# Patient Record
Sex: FEMALE | Race: WHITE | NOT HISPANIC OR LATINO | Employment: FULL TIME | URBAN - METROPOLITAN AREA
[De-identification: names, ages, dates, MRNs, and addresses within clinical notes are randomized per-mention and may not be internally consistent; named-entity substitution may affect disease eponyms.]

---

## 2023-03-27 LAB
CHOLESTEROL (MG/DL) IN SER/PLAS: 127 MG/DL (ref 0–199)
CHOLESTEROL IN HDL (MG/DL) IN SER/PLAS: 48.4 MG/DL
CHOLESTEROL/HDL RATIO: 2.6
LDL: 63 MG/DL (ref 0–99)
TRIGLYCERIDE (MG/DL) IN SER/PLAS: 76 MG/DL (ref 0–149)
VLDL: 15 MG/DL (ref 0–40)

## 2023-05-01 LAB
ALANINE AMINOTRANSFERASE (SGPT) (U/L) IN SER/PLAS: 16 U/L (ref 7–45)
ALBUMIN (G/DL) IN SER/PLAS: 4.6 G/DL (ref 3.4–5)
ALKALINE PHOSPHATASE (U/L) IN SER/PLAS: 61 U/L (ref 33–136)
ANION GAP IN SER/PLAS: 11 MMOL/L (ref 10–20)
ASPARTATE AMINOTRANSFERASE (SGOT) (U/L) IN SER/PLAS: 19 U/L (ref 9–39)
BASOPHILS (10*3/UL) IN BLOOD BY AUTOMATED COUNT: 0.03 X10E9/L (ref 0–0.1)
BASOPHILS/100 LEUKOCYTES IN BLOOD BY AUTOMATED COUNT: 0.6 % (ref 0–2)
BILIRUBIN TOTAL (MG/DL) IN SER/PLAS: 0.4 MG/DL (ref 0–1.2)
CALCIDIOL (25 OH VITAMIN D3) (NG/ML) IN SER/PLAS: 43 NG/ML
CALCIUM (MG/DL) IN SER/PLAS: 9.3 MG/DL (ref 8.6–10.3)
CARBON DIOXIDE, TOTAL (MMOL/L) IN SER/PLAS: 28 MMOL/L (ref 21–32)
CHLORIDE (MMOL/L) IN SER/PLAS: 102 MMOL/L (ref 98–107)
CREATININE (MG/DL) IN SER/PLAS: 0.81 MG/DL (ref 0.5–1.05)
EOSINOPHILS (10*3/UL) IN BLOOD BY AUTOMATED COUNT: 0.1 X10E9/L (ref 0–0.7)
EOSINOPHILS/100 LEUKOCYTES IN BLOOD BY AUTOMATED COUNT: 2 % (ref 0–6)
ERYTHROCYTE DISTRIBUTION WIDTH (RATIO) BY AUTOMATED COUNT: 12.4 % (ref 11.5–14.5)
ERYTHROCYTE MEAN CORPUSCULAR HEMOGLOBIN CONCENTRATION (G/DL) BY AUTOMATED: 32.4 G/DL (ref 32–36)
ERYTHROCYTE MEAN CORPUSCULAR VOLUME (FL) BY AUTOMATED COUNT: 86 FL (ref 80–100)
ERYTHROCYTES (10*6/UL) IN BLOOD BY AUTOMATED COUNT: 4.61 X10E12/L (ref 4–5.2)
GFR FEMALE: 82 ML/MIN/1.73M2
GLUCOSE (MG/DL) IN SER/PLAS: 109 MG/DL (ref 74–99)
HEMATOCRIT (%) IN BLOOD BY AUTOMATED COUNT: 39.8 % (ref 36–46)
HEMOGLOBIN (G/DL) IN BLOOD: 12.9 G/DL (ref 12–16)
IMMATURE GRANULOCYTES/100 LEUKOCYTES IN BLOOD BY AUTOMATED COUNT: 0.2 % (ref 0–0.9)
LEUKOCYTES (10*3/UL) IN BLOOD BY AUTOMATED COUNT: 5 X10E9/L (ref 4.4–11.3)
LYMPHOCYTES (10*3/UL) IN BLOOD BY AUTOMATED COUNT: 2.05 X10E9/L (ref 1.2–4.8)
LYMPHOCYTES/100 LEUKOCYTES IN BLOOD BY AUTOMATED COUNT: 41.3 % (ref 13–44)
MONOCYTES (10*3/UL) IN BLOOD BY AUTOMATED COUNT: 0.3 X10E9/L (ref 0.1–1)
MONOCYTES/100 LEUKOCYTES IN BLOOD BY AUTOMATED COUNT: 6 % (ref 2–10)
NEUTROPHILS (10*3/UL) IN BLOOD BY AUTOMATED COUNT: 2.47 X10E9/L (ref 1.2–7.7)
NEUTROPHILS/100 LEUKOCYTES IN BLOOD BY AUTOMATED COUNT: 49.9 % (ref 40–80)
NRBC (PER 100 WBCS) BY AUTOMATED COUNT: 0 /100 WBC (ref 0–0)
PLATELETS (10*3/UL) IN BLOOD AUTOMATED COUNT: 313 X10E9/L (ref 150–450)
POTASSIUM (MMOL/L) IN SER/PLAS: 4 MMOL/L (ref 3.5–5.3)
PROTEIN TOTAL: 7.4 G/DL (ref 6.4–8.2)
SEDIMENTATION RATE, ERYTHROCYTE: 4 MM/H (ref 0–30)
SODIUM (MMOL/L) IN SER/PLAS: 137 MMOL/L (ref 136–145)
THYROTROPIN (MIU/L) IN SER/PLAS BY DETECTION LIMIT <= 0.05 MIU/L: 1.82 MIU/L (ref 0.44–3.98)
UREA NITROGEN (MG/DL) IN SER/PLAS: 14 MG/DL (ref 6–23)

## 2023-05-03 LAB
ANA PATTERN: ABNORMAL
ANA TITER: ABNORMAL
ANTI-CENTROMERE: <0.2 AI
ANTI-CHROMATIN: <0.2 AI
ANTI-DNA (DS): 1 IU/ML
ANTI-JO-1 IGG: <0.2 AI
ANTI-NUCLEAR ANTIBODY (ANA): POSITIVE
ANTI-RIBOSOMAL P: <0.2 AI
ANTI-RNP: 0.4 AI
ANTI-SCL-70: <0.2 AI
ANTI-SM/RNP: <0.2 AI
ANTI-SM: <0.2 AI
ANTI-SSA: <0.2 AI
ANTI-SSB: <0.2 AI

## 2023-08-22 LAB
APPEARANCE, URINE: CLEAR
BILIRUBIN, URINE: NEGATIVE
BLOOD, URINE: NEGATIVE
COLOR, URINE: YELLOW
COMPLEMENT C3 (MG/DL) IN SER/PLAS: 145 MG/DL (ref 87–200)
COMPLEMENT C4 (MG/DL) IN SER/PLAS: 27 MG/DL (ref 10–50)
GLUCOSE, URINE: NEGATIVE MG/DL
KETONES, URINE: NEGATIVE MG/DL
LEUKOCYTE ESTERASE, URINE: ABNORMAL
MUCUS, URINE: NORMAL /LPF
NITRITE, URINE: NEGATIVE
PH, URINE: 8 (ref 5–8)
PROTEIN, URINE: NEGATIVE MG/DL
RBC, URINE: NORMAL /HPF (ref 0–5)
SPECIFIC GRAVITY, URINE: 1.01 (ref 1–1.03)
SQUAMOUS EPITHELIAL CELLS, URINE: <1 /HPF
UROBILINOGEN, URINE: <2 MG/DL (ref 0–1.9)
WBC, URINE: 2 /HPF (ref 0–5)

## 2023-10-10 ENCOUNTER — APPOINTMENT (OUTPATIENT)
Dept: RHEUMATOLOGY | Facility: CLINIC | Age: 63
End: 2023-10-10

## 2023-10-23 ENCOUNTER — TELEPHONE (OUTPATIENT)
Dept: PRIMARY CARE | Facility: CLINIC | Age: 63
End: 2023-10-23

## 2023-10-23 RX ORDER — DULOXETIN HYDROCHLORIDE 60 MG/1
CAPSULE, DELAYED RELEASE ORAL
COMMUNITY
End: 2023-10-26 | Stop reason: SDUPTHER

## 2023-10-23 NOTE — TELEPHONE ENCOUNTER
Rx Refill Request Telephone Encounter    Name:  Celia Gomez  :  [id number]  Medication Name:  duloxetine  60mg  By mouth  daily  90    Specific Pharmacy location:  [facility]  Date of last appointment:    Date of next appointment:  no appt scheduled  Best number to reach patient:  [phone]

## 2023-10-26 ENCOUNTER — TELEPHONE (OUTPATIENT)
Dept: OBSTETRICS AND GYNECOLOGY | Facility: CLINIC | Age: 63
End: 2023-10-26

## 2023-10-26 DIAGNOSIS — M79.7 FIBROMYALGIA: Primary | ICD-10-CM

## 2023-10-26 DIAGNOSIS — Z12.31 VISIT FOR SCREENING MAMMOGRAM: ICD-10-CM

## 2023-10-26 RX ORDER — DULOXETIN HYDROCHLORIDE 60 MG/1
60 CAPSULE, DELAYED RELEASE ORAL DAILY
Qty: 30 CAPSULE | Refills: 0 | Status: SHIPPED | OUTPATIENT
Start: 2023-10-26 | End: 2023-11-21 | Stop reason: SDUPTHER

## 2023-10-26 NOTE — TELEPHONE ENCOUNTER
Pt. Has here annual scheduled for 2/1/2023. She is asking for a mammogram order prior to this appointment.

## 2023-10-26 NOTE — PROGRESS NOTES
She was supposed to be seen in 3 months time or something like that and it is due to be seen nevertheless as she ran out of the medication I am sending 30 days prescription and she will be asked to be seen  And all the medications needs to be reconciled and reviewed in the system again

## 2023-11-09 ENCOUNTER — HOSPITAL ENCOUNTER (OUTPATIENT)
Dept: RADIOLOGY | Facility: HOSPITAL | Age: 63
Discharge: HOME | End: 2023-11-09
Payer: COMMERCIAL

## 2023-11-09 VITALS — BODY MASS INDEX: 28.93 KG/M2 | WEIGHT: 180 LBS | HEIGHT: 66 IN

## 2023-11-09 DIAGNOSIS — N63.0 LUMP IN FEMALE BREAST: ICD-10-CM

## 2023-11-09 DIAGNOSIS — Z12.31 VISIT FOR SCREENING MAMMOGRAM: ICD-10-CM

## 2023-11-09 PROCEDURE — 76642 ULTRASOUND BREAST LIMITED: CPT | Performed by: RADIOLOGY

## 2023-11-09 PROCEDURE — 77066 DX MAMMO INCL CAD BI: CPT | Performed by: RADIOLOGY

## 2023-11-09 PROCEDURE — 77062 BREAST TOMOSYNTHESIS BI: CPT | Performed by: RADIOLOGY

## 2023-11-09 PROCEDURE — 77062 BREAST TOMOSYNTHESIS BI: CPT

## 2023-11-09 PROCEDURE — 76642 ULTRASOUND BREAST LIMITED: CPT | Mod: LT

## 2023-11-21 ENCOUNTER — OFFICE VISIT (OUTPATIENT)
Dept: PRIMARY CARE | Facility: CLINIC | Age: 63
End: 2023-11-21
Payer: COMMERCIAL

## 2023-11-21 VITALS
BODY MASS INDEX: 30.25 KG/M2 | TEMPERATURE: 98.4 F | DIASTOLIC BLOOD PRESSURE: 87 MMHG | HEIGHT: 66 IN | SYSTOLIC BLOOD PRESSURE: 130 MMHG | WEIGHT: 188.2 LBS | HEART RATE: 83 BPM

## 2023-11-21 DIAGNOSIS — K57.30 DIVERTICULOSIS OF COLON: ICD-10-CM

## 2023-11-21 DIAGNOSIS — L94.0 REYNOLDS SYNDROME (MULTI): ICD-10-CM

## 2023-11-21 DIAGNOSIS — K21.9 GASTROESOPHAGEAL REFLUX DISEASE WITHOUT ESOPHAGITIS: Primary | ICD-10-CM

## 2023-11-21 DIAGNOSIS — G47.33 OSA (OBSTRUCTIVE SLEEP APNEA): ICD-10-CM

## 2023-11-21 DIAGNOSIS — I10 HTN (HYPERTENSION), BENIGN: ICD-10-CM

## 2023-11-21 DIAGNOSIS — H92.09 EAR ACHE: ICD-10-CM

## 2023-11-21 DIAGNOSIS — E78.5 HYPERLIPIDEMIA, UNSPECIFIED HYPERLIPIDEMIA TYPE: ICD-10-CM

## 2023-11-21 DIAGNOSIS — M79.7 FIBROMYALGIA: ICD-10-CM

## 2023-11-21 DIAGNOSIS — Z00.00 WELL ADULT HEALTH CHECK: ICD-10-CM

## 2023-11-21 DIAGNOSIS — R76.8 ANA POSITIVE: ICD-10-CM

## 2023-11-21 DIAGNOSIS — K74.3 REYNOLDS SYNDROME (MULTI): ICD-10-CM

## 2023-11-21 PROBLEM — J06.9 UPPER RESPIRATORY INFECTION: Status: ACTIVE | Noted: 2023-11-21

## 2023-11-21 PROBLEM — R92.8 ABNORMAL MAMMOGRAM: Status: ACTIVE | Noted: 2023-11-21

## 2023-11-21 PROBLEM — G89.29 CHRONIC PAIN OF BOTH KNEES: Status: ACTIVE | Noted: 2023-11-21
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
## 2023-11-21 PROBLEM — M47.22 OSTEOARTHRITIS OF SPINE WITH RADICULOPATHY, CERVICAL REGION: Status: ACTIVE | Noted: 2023-11-21

## 2023-11-21 PROBLEM — J30.9 ALLERGIC RHINITIS: Status: ACTIVE | Noted: 2023-11-21

## 2023-11-21 PROBLEM — D64.9 ANEMIA: Status: ACTIVE | Noted: 2023-11-21

## 2023-11-21 PROBLEM — S20.219A CONTUSION OF CHEST WALL: Status: ACTIVE | Noted: 2023-11-21

## 2023-11-21 PROBLEM — L71.9 ROSACEA: Status: ACTIVE | Noted: 2023-11-21

## 2023-11-21 PROBLEM — R82.90 ABNORMAL URINE FINDINGS: Status: ACTIVE | Noted: 2023-11-21

## 2023-11-21 PROBLEM — M71.9 DISORDER OF BURSAE OF SHOULDER REGION: Status: ACTIVE | Noted: 2022-06-13

## 2023-11-21 PROBLEM — H10.9 CONJUNCTIVITIS, BACTERIAL: Status: ACTIVE | Noted: 2023-11-21

## 2023-11-21 PROBLEM — R09.81 NASAL CONGESTION: Status: ACTIVE | Noted: 2023-11-21

## 2023-11-21 PROBLEM — J40 BRONCHITIS: Status: ACTIVE | Noted: 2023-11-21

## 2023-11-21 PROBLEM — R73.9 HYPERGLYCEMIA: Status: ACTIVE | Noted: 2023-11-21

## 2023-11-21 PROBLEM — S92.309A CLOSED FRACTURE OF METATARSAL BONE: Status: ACTIVE | Noted: 2022-05-10

## 2023-11-21 PROBLEM — R52 ACUTE PAIN: Status: ACTIVE | Noted: 2023-11-21

## 2023-11-21 PROBLEM — R10.9 ABDOMINAL CRAMPING: Status: ACTIVE | Noted: 2023-11-21

## 2023-11-21 PROBLEM — R51.9 HEADACHE: Status: ACTIVE | Noted: 2023-11-21

## 2023-11-21 PROBLEM — S62.101A WRIST FRACTURE, RIGHT: Status: ACTIVE | Noted: 2023-11-21

## 2023-11-21 PROBLEM — R05.9 COUGH: Status: ACTIVE | Noted: 2023-11-21

## 2023-11-21 PROBLEM — R20.2 PARESTHESIA AND PAIN OF EXTREMITY: Status: ACTIVE | Noted: 2023-11-21

## 2023-11-21 PROBLEM — S83.429A SPRAIN OF LATERAL COLLATERAL LIGAMENT OF KNEE: Status: ACTIVE | Noted: 2022-05-10

## 2023-11-21 PROBLEM — J45.990 ASTHMA, EXERCISE INDUCED (HHS-HCC): Status: ACTIVE | Noted: 2023-11-21

## 2023-11-21 PROBLEM — N63.0 BREAST MASS: Status: ACTIVE | Noted: 2023-11-21

## 2023-11-21 PROBLEM — M25.562 CHRONIC PAIN OF BOTH KNEES: Status: ACTIVE | Noted: 2023-11-21

## 2023-11-21 PROBLEM — S52.539A CLOSED COLLES' FRACTURE: Status: ACTIVE | Noted: 2022-05-10

## 2023-11-21 PROBLEM — M79.609 PARESTHESIA AND PAIN OF EXTREMITY: Status: ACTIVE | Noted: 2023-11-21

## 2023-11-21 PROBLEM — N32.9 BLADDER DISORDER: Status: ACTIVE | Noted: 2023-11-21

## 2023-11-21 PROBLEM — J02.9 SORE THROAT: Status: ACTIVE | Noted: 2023-11-21

## 2023-11-21 PROBLEM — H92.01 RIGHT EAR PAIN: Status: ACTIVE | Noted: 2023-11-21

## 2023-11-21 PROBLEM — K52.9 CHRONIC DIARRHEA: Status: ACTIVE | Noted: 2023-11-21

## 2023-11-21 PROBLEM — N63.20 LUMP OF BREAST, LEFT: Status: ACTIVE | Noted: 2023-11-21

## 2023-11-21 PROBLEM — M47.812 DJD (DEGENERATIVE JOINT DISEASE) OF CERVICAL SPINE: Status: ACTIVE | Noted: 2023-11-21

## 2023-11-21 PROBLEM — M25.579 ANKLE PAIN: Status: ACTIVE | Noted: 2023-11-21

## 2023-11-21 PROBLEM — D36.9 ADENOMATOUS POLYPS: Status: ACTIVE | Noted: 2023-11-21

## 2023-11-21 PROBLEM — N95.2 POSTMENOPAUSAL ATROPHIC VAGINITIS: Status: ACTIVE | Noted: 2023-11-21

## 2023-11-21 PROBLEM — D36.9 INTRADUCTAL PAPILLOMA: Status: ACTIVE | Noted: 2023-11-21

## 2023-11-21 PROBLEM — Z20.822 EXPOSURE TO COVID-19 VIRUS: Status: ACTIVE | Noted: 2023-11-21

## 2023-11-21 PROBLEM — M25.561 CHRONIC PAIN OF BOTH KNEES: Status: ACTIVE | Noted: 2023-11-21

## 2023-11-21 PROBLEM — J98.01 COUGH DUE TO BRONCHOSPASM: Status: ACTIVE | Noted: 2023-11-21

## 2023-11-21 PROBLEM — R23.2 HOT FLASHES: Status: ACTIVE | Noted: 2023-11-21

## 2023-11-21 PROBLEM — M62.838 MUSCLE SPASMS OF NECK: Status: ACTIVE | Noted: 2023-11-21

## 2023-11-21 PROCEDURE — 3075F SYST BP GE 130 - 139MM HG: CPT | Performed by: INTERNAL MEDICINE

## 2023-11-21 PROCEDURE — 3079F DIAST BP 80-89 MM HG: CPT | Performed by: INTERNAL MEDICINE

## 2023-11-21 PROCEDURE — 1036F TOBACCO NON-USER: CPT | Performed by: INTERNAL MEDICINE

## 2023-11-21 PROCEDURE — 99214 OFFICE O/P EST MOD 30 MIN: CPT | Performed by: INTERNAL MEDICINE

## 2023-11-21 RX ORDER — HYDROCHLOROTHIAZIDE 25 MG/1
25 TABLET ORAL DAILY
COMMUNITY
Start: 2023-09-02 | End: 2023-11-21 | Stop reason: SDUPTHER

## 2023-11-21 RX ORDER — UBIDECARENONE 75 MG
1 CAPSULE ORAL DAILY
COMMUNITY

## 2023-11-21 RX ORDER — PANTOPRAZOLE SODIUM 40 MG/1
40 TABLET, DELAYED RELEASE ORAL DAILY
Qty: 90 TABLET | Refills: 1 | Status: SHIPPED | OUTPATIENT
Start: 2023-11-21 | End: 2024-11-20

## 2023-11-21 RX ORDER — DULOXETIN HYDROCHLORIDE 60 MG/1
60 CAPSULE, DELAYED RELEASE ORAL DAILY
Qty: 30 CAPSULE | Refills: 0 | Status: SHIPPED | OUTPATIENT
Start: 2023-11-21 | End: 2024-01-04 | Stop reason: SDUPTHER

## 2023-11-21 RX ORDER — ROSUVASTATIN CALCIUM 5 MG/1
5 TABLET, COATED ORAL DAILY
Qty: 90 TABLET | Refills: 0 | Status: SHIPPED | OUTPATIENT
Start: 2023-11-21 | End: 2024-03-26 | Stop reason: SDUPTHER

## 2023-11-21 RX ORDER — CHOLECALCIFEROL (VITAMIN D3) 25 MCG
1000 TABLET ORAL DAILY
COMMUNITY

## 2023-11-21 RX ORDER — CALCIUM CARBONATE/VITAMIN D3 500-10/5ML
1 LIQUID (ML) ORAL DAILY
COMMUNITY

## 2023-11-21 RX ORDER — OMEPRAZOLE 20 MG/1
20 CAPSULE, DELAYED RELEASE ORAL EVERY OTHER DAY
COMMUNITY
Start: 2023-07-22 | End: 2023-11-21 | Stop reason: ALTCHOICE

## 2023-11-21 RX ORDER — HYDROCHLOROTHIAZIDE 25 MG/1
25 TABLET ORAL DAILY
Qty: 90 TABLET | Refills: 0 | Status: SHIPPED | OUTPATIENT
Start: 2023-11-21 | End: 2024-03-26 | Stop reason: SDUPTHER

## 2023-11-21 RX ORDER — ROSUVASTATIN CALCIUM 5 MG/1
5 TABLET, COATED ORAL DAILY
COMMUNITY
End: 2023-11-21 | Stop reason: SDUPTHER

## 2023-11-21 NOTE — PROGRESS NOTES
Assessment/Plan   Problem List Items Addressed This Visit       Diverticulosis of colon    GERD (gastroesophageal reflux disease) - Primary    Relevant Medications    pantoprazole (ProtoNix) 40 mg EC tablet    Other Relevant Orders    CBC and Auto Differential    Comprehensive Metabolic Panel    Fibromyalgia    Relevant Medications    DULoxetine (Cymbalta) 60 mg DR capsule    Other Relevant Orders    CBC and Auto Differential    Comprehensive Metabolic Panel    HTN (hypertension), benign    Relevant Medications    hydroCHLOROthiazide (HYDRODiuril) 25 mg tablet    JEREMY (obstructive sleep apnea)    Relevant Orders    Referral to ENT    Burrell syndrome (CMS/HCC)    Ear ache    Relevant Orders    Referral to ENT    Well adult health check    Relevant Orders    Comprehensive Metabolic Panel    XU positive     Other Visit Diagnoses       Hyperlipidemia, unspecified hyperlipidemia type        Relevant Medications    rosuvastatin (Crestor) 5 mg tablet    Other Relevant Orders    Lipid Panel        Follow-up in 3 months unless otherwise indicated  All the conditions discussed  In view of her recurrent symptoms of different nature in oropharyngeal pathway atypical GERD is being considering changing omeprazole to Protonix and advised to see ENT physician  Eventually further follow-up with rheumatologist may be needed    Subjective   Patient ID: Celia Gomez is a 63 y.o. female who presents for Otitis Media (Right ear pain.).    Past Surgical History:   Procedure Laterality Date    CT ABDOMEN PELVIS ANGIOGRAM W AND/OR WO IV CONTRAST  05/04/2023    CT ABDOMEN PELVIS ANGIOGRAM W AND/OR WO IV CONTRAST 5/4/2023 [facility]    INCISIONAL BREAST BIOPSY  10/10/2017    Incisional Breast Biopsy    OTHER SURGICAL HISTORY  12/17/2012    Electrocardiogram    OTHER SURGICAL HISTORY  10/26/2022    Endoscopy    OTHER SURGICAL HISTORY  10/26/2022    Colonoscopy    OTHER SURGICAL HISTORY  05/23/2022    Wrist surgery    OTHER SURGICAL HISTORY   10/10/2017    Breast Surgery Puncture Aspiration Of Cyst      Family History   Problem Relation Name Age of Onset    Breast cancer Maternal Grandmother  60      Social History     Socioeconomic History    Marital status:      Spouse name: Not on file    Number of children: Not on file    Years of education: Not on file    Highest education level: Not on file   Occupational History    Not on file   Tobacco Use    Smoking status: Former     Types: Cigarettes     Quit date:      Years since quittin.8    Smokeless tobacco: Never   Substance and Sexual Activity    Alcohol use: Not Currently    Drug use: Never    Sexual activity: Not on file   Other Topics Concern    Not on file   Social History Narrative    Not on file     Social Determinants of Health     Financial Resource Strain: Not on file   Food Insecurity: Not on file   Transportation Needs: Not on file   Physical Activity: Not on file   Stress: Not on file   Social Connections: Not on file   Intimate Partner Violence: Not on file   Housing Stability: Not on file      Bee pollen, Fruit flavor, and Mold   Current Outpatient Medications   Medication Sig Dispense Refill    cholecalciferol (Vitamin D-3) 25 MCG (1000 UT) tablet Take 1 tablet (1,000 Units) by mouth once daily.      cyanocobalamin (Vitamin B-12) 500 mcg tablet Take 1 tablet (500 mcg) by mouth once daily.      magnesium oxide 400 mg magnesium capsule Take 1 capsule (400 mg) by mouth once daily.      multivit-min/ferrous fumarate (MULTI VITAMIN ORAL) Take 1 tablet by mouth once daily.      TURMERIC ORAL Take 1 tablet by mouth once daily.      DULoxetine (Cymbalta) 60 mg DR capsule Take 1 capsule (60 mg) by mouth once daily. 30 capsule 0    hydroCHLOROthiazide (HYDRODiuril) 25 mg tablet Take 1 tablet (25 mg) by mouth once daily. 90 tablet 0    pantoprazole (ProtoNix) 40 mg EC tablet Take 1 tablet (40 mg) by mouth once daily. Do not crush, chew, or split. 90 tablet 1    rosuvastatin (Crestor)  "5 mg tablet Take 1 tablet (5 mg) by mouth once daily. 90 tablet 0     No current facility-administered medications for this visit.      Vitals:    11/21/23 0947   BP: 130/87   BP Location: Left arm   Patient Position: Sitting   Pulse: 83   Temp: 36.9 °C (98.4 °F)   Weight: 85.4 kg (188 lb 3.2 oz)   Height: 1.676 m (5' 6\")      Problem List Items Addressed This Visit       Diverticulosis of colon    GERD (gastroesophageal reflux disease) - Primary    Relevant Medications    pantoprazole (ProtoNix) 40 mg EC tablet    Other Relevant Orders    CBC and Auto Differential    Comprehensive Metabolic Panel    Fibromyalgia    Relevant Medications    DULoxetine (Cymbalta) 60 mg DR capsule    Other Relevant Orders    CBC and Auto Differential    Comprehensive Metabolic Panel    HTN (hypertension), benign    Relevant Medications    hydroCHLOROthiazide (HYDRODiuril) 25 mg tablet    JEREMY (obstructive sleep apnea)    Relevant Orders    Referral to ENT    Burrell syndrome (CMS/HCC)    Ear ache    Relevant Orders    Referral to ENT    Well adult health check    Relevant Orders    Comprehensive Metabolic Panel    XU positive     Other Visit Diagnoses       Hyperlipidemia, unspecified hyperlipidemia type        Relevant Medications    rosuvastatin (Crestor) 5 mg tablet    Other Relevant Orders    Lipid Panel           Orders Placed This Encounter   Procedures    CBC and Auto Differential     Standing Status:   Future     Standing Expiration Date:   11/21/2024     Order Specific Question:   Release result to MyChart     Answer:   Immediate    Comprehensive Metabolic Panel     Standing Status:   Future     Standing Expiration Date:   11/21/2024     Order Specific Question:   Release result to MyChart     Answer:   Immediate    Lipid Panel     Standing Status:   Future     Standing Expiration Date:   11/21/2024     Order Specific Question:   Release result to MyChart     Answer:   Immediate    Referral to ENT     Standing Status:   " "Future     Standing Expiration Date:   5/21/2024     Referral Priority:   Routine     Referral Type:   Consultation     Referral Reason:   Specialty Services Required     Requested Specialty:   Otolaryngology     Number of Visits Requested:   1        HPI  Came with main complaint that she has having some oropharyngeal issues including right-sided ear pressure right TMJ issue right-sided neck discomfort some sore throat and intermittent going on for a long time in addition to fibromyalgia symptoms of aches and pains    ROS as above    PHYSICAL EXAM  Questionable pressure in the right ear  Tonsils are shrunk  Whitish spot on the left tonsil fossa probably old scar  Cardiovascular chest abdominal neurological examination normal     Results for orders placed or performed in visit on 08/22/23   C3 Complement   Result Value Ref Range    COMPLEMENT C3 (MG/DL) IN SER/PLAS 145 87 - 200 mg/dL   C4 Complement   Result Value Ref Range    COMPLEMENT C4 (MG/DL) IN SER/PLAS 27 10 - 50 mg/dL   Urinalysis with Reflex Microscopic   Result Value Ref Range    Color, Urine YELLOW STRAW,YELLOW    Appearance, Urine CLEAR CLEAR    Specific Gravity, Urine 1.009 1.005 - 1.035    pH, Urine 8.0 5.0 - 8.0    Protein, Urine NEGATIVE NEGATIVE mg/dL    Glucose, Urine NEGATIVE NEGATIVE mg/dL    Blood, Urine NEGATIVE NEGATIVE    Ketones, Urine NEGATIVE NEGATIVE mg/dL    Bilirubin, Urine NEGATIVE NEGATIVE    Urobilinogen, Urine <2.0 0.0 - 1.9 mg/dL    Nitrite, Urine NEGATIVE NEGATIVE    Leukocyte Esterase, Urine SMALL (1+) (A) NEGATIVE   Urinalysis Microscopic Only   Result Value Ref Range    WBC, Urine 2 0 - 5 /HPF    RBC, Urine NONE 0 - 5 /HPF    Squamous Epithelial Cells, Urine <1 /HPF    Mucus, Urine 1+ /LPF           No results found for: \"PR1\", \"BMPR1A\", \"CMPLAS\", \"DG8KVHHX\", \"KPSAT\"   Lab Results   Component Value Date    CHOL 127 03/27/2023    CHHDL 2.6 03/27/2023                "

## 2023-11-30 ENCOUNTER — TELEMEDICINE (OUTPATIENT)
Dept: PRIMARY CARE | Facility: CLINIC | Age: 63
End: 2023-11-30
Payer: COMMERCIAL

## 2023-11-30 DIAGNOSIS — J30.9 ALLERGIC RHINITIS, UNSPECIFIED SEASONALITY, UNSPECIFIED TRIGGER: ICD-10-CM

## 2023-11-30 DIAGNOSIS — J06.9 URTI (ACUTE UPPER RESPIRATORY INFECTION): Primary | ICD-10-CM

## 2023-11-30 DIAGNOSIS — H10.33 ACUTE CONJUNCTIVITIS OF BOTH EYES, UNSPECIFIED ACUTE CONJUNCTIVITIS TYPE: ICD-10-CM

## 2023-11-30 PROCEDURE — 99213 OFFICE O/P EST LOW 20 MIN: CPT | Performed by: INTERNAL MEDICINE

## 2023-11-30 RX ORDER — AZITHROMYCIN 250 MG/1
TABLET, FILM COATED ORAL
Qty: 6 TABLET | Refills: 0 | Status: SHIPPED | OUTPATIENT
Start: 2023-11-30 | End: 2023-12-05

## 2023-11-30 RX ORDER — TOBRAMYCIN 3 MG/G
0.5 OINTMENT OPHTHALMIC 3 TIMES DAILY
Qty: 21 G | Refills: 0 | Status: SHIPPED | OUTPATIENT
Start: 2023-11-30 | End: 2023-12-07

## 2023-11-30 ASSESSMENT — PATIENT HEALTH QUESTIONNAIRE - PHQ9
1. LITTLE INTEREST OR PLEASURE IN DOING THINGS: NOT AT ALL
2. FEELING DOWN, DEPRESSED OR HOPELESS: NOT AT ALL
SUM OF ALL RESPONSES TO PHQ9 QUESTIONS 1 AND 2: 0

## 2023-11-30 NOTE — PROGRESS NOTES
Assessment/Plan   Problem List Items Addressed This Visit       URTI (acute upper respiratory infection) - Primary    Relevant Medications    azithromycin (Zithromax) 250 mg tablet    Acute conjunctivitis of both eyes    Relevant Medications    tobramycin (Tobrex) 0.3 % ophthalmic ointment   Discussed the condition and treatment as above  Follow-up as needed or as previously planned  Subjective   Patient ID: Celia Gomez is a 63 y.o. female who presents for URI (With eye infection.  Symptoms started 3 days ago.  States eye infection started 2023.  Covid test done 2023 was negative.  ), Cough, and Sore Throat.    Past Surgical History:   Procedure Laterality Date    CT ABDOMEN PELVIS ANGIOGRAM W AND/OR WO IV CONTRAST  2023    CT ABDOMEN PELVIS ANGIOGRAM W AND/OR WO IV CONTRAST 2023 [facility]    INCISIONAL BREAST BIOPSY  10/10/2017    Incisional Breast Biopsy    OTHER SURGICAL HISTORY  2012    Electrocardiogram    OTHER SURGICAL HISTORY  10/26/2022    Endoscopy    OTHER SURGICAL HISTORY  10/26/2022    Colonoscopy    OTHER SURGICAL HISTORY  2022    Wrist surgery    OTHER SURGICAL HISTORY  10/10/2017    Breast Surgery Puncture Aspiration Of Cyst      Family History   Problem Relation Name Age of Onset    Breast cancer Maternal Grandmother  60      Social History     Socioeconomic History    Marital status:      Spouse name: Not on file    Number of children: Not on file    Years of education: Not on file    Highest education level: Not on file   Occupational History    Not on file   Tobacco Use    Smoking status: Former     Types: Cigarettes     Quit date:      Years since quittin.9    Smokeless tobacco: Never   Substance and Sexual Activity    Alcohol use: Not Currently    Drug use: Never    Sexual activity: Not on file   Other Topics Concern    Not on file   Social History Narrative    Not on file     Social Determinants of Health     Financial Resource Strain: Not  on file   Food Insecurity: Not on file   Transportation Needs: Not on file   Physical Activity: Not on file   Stress: Not on file   Social Connections: Not on file   Intimate Partner Violence: Not on file   Housing Stability: Not on file      Bee pollen, Fruit flavor, and Mold   Current Outpatient Medications   Medication Sig Dispense Refill    cholecalciferol (Vitamin D-3) 25 MCG (1000 UT) tablet Take 1 tablet (1,000 Units) by mouth once daily.      cyanocobalamin (Vitamin B-12) 500 mcg tablet Take 1 tablet (500 mcg) by mouth once daily.      DULoxetine (Cymbalta) 60 mg DR capsule Take 1 capsule (60 mg) by mouth once daily. 30 capsule 0    hydroCHLOROthiazide (HYDRODiuril) 25 mg tablet Take 1 tablet (25 mg) by mouth once daily. 90 tablet 0    magnesium oxide 400 mg magnesium capsule Take 1 capsule (400 mg) by mouth once daily.      multivit-min/ferrous fumarate (MULTI VITAMIN ORAL) Take 1 tablet by mouth once daily.      pantoprazole (ProtoNix) 40 mg EC tablet Take 1 tablet (40 mg) by mouth once daily. Do not crush, chew, or split. 90 tablet 1    rosuvastatin (Crestor) 5 mg tablet Take 1 tablet (5 mg) by mouth once daily. 90 tablet 0    TURMERIC ORAL Take 1 tablet by mouth once daily.      azithromycin (Zithromax) 250 mg tablet Take 2 tablets (500 mg) by mouth once daily for 1 day, THEN 1 tablet (250 mg) once daily for 4 days. Take 2 tabs (500 mg) by mouth today, than 1 daily for 4 days.. 6 tablet 0    tobramycin (Tobrex) 0.3 % ophthalmic ointment Apply 0.5 inches to both eyes 3 times a day for 7 days. 21 g 0     No current facility-administered medications for this visit.      There were no vitals filed for this visit.   Problem List Items Addressed This Visit       URTI (acute upper respiratory infection) - Primary    Relevant Medications    azithromycin (Zithromax) 250 mg tablet    Acute conjunctivitis of both eyes    Relevant Medications    tobramycin (Tobrex) 0.3 % ophthalmic ointment      No orders of the  "defined types were placed in this encounter.       HPI  This is a very pleasant 63-year-old female whom I have seen not long ago presented for 1 week duration of cough congestion purulent nasal discharge and purulent eye glue bilaterally and the redness in the eye  She was congested and having some headache she had sore throat and she tested for COVID that was negative  There was no nausea vomiting or diarrhea  She has a history of GERD hypertension and dyslipidemia  ROS as mentioned above    PHYSICAL EXAM  Limited by visual impression she is not in distress awake alert orientated respiration is normal  Eyes are little congested on conjunctiva        No results found for: \"PR1\", \"BMPR1A\", \"CMPLAS\", \"QT9NDVKH\", \"KPSAT\"   Lab Results   Component Value Date    CHOL 127 03/27/2023    CHHDL 2.6 03/27/2023                "

## 2023-12-01 ENCOUNTER — TELEPHONE (OUTPATIENT)
Dept: PRIMARY CARE | Facility: CLINIC | Age: 63
End: 2023-12-01

## 2024-01-04 DIAGNOSIS — M79.7 FIBROMYALGIA: ICD-10-CM

## 2024-01-04 RX ORDER — DULOXETIN HYDROCHLORIDE 60 MG/1
60 CAPSULE, DELAYED RELEASE ORAL DAILY
Qty: 30 CAPSULE | Refills: 0 | Status: SHIPPED | OUTPATIENT
Start: 2024-01-04 | End: 2024-02-15 | Stop reason: SDUPTHER

## 2024-01-05 ENCOUNTER — LAB (OUTPATIENT)
Dept: LAB | Facility: LAB | Age: 64
End: 2024-01-05
Payer: COMMERCIAL

## 2024-01-05 DIAGNOSIS — M79.7 FIBROMYALGIA: ICD-10-CM

## 2024-01-05 DIAGNOSIS — E78.5 HYPERLIPIDEMIA, UNSPECIFIED HYPERLIPIDEMIA TYPE: ICD-10-CM

## 2024-01-05 DIAGNOSIS — K21.9 GASTROESOPHAGEAL REFLUX DISEASE WITHOUT ESOPHAGITIS: ICD-10-CM

## 2024-01-05 DIAGNOSIS — Z00.00 WELL ADULT HEALTH CHECK: ICD-10-CM

## 2024-01-05 LAB
ALBUMIN SERPL BCP-MCNC: 4.8 G/DL (ref 3.4–5)
ALP SERPL-CCNC: 61 U/L (ref 33–136)
ALT SERPL W P-5'-P-CCNC: 30 U/L (ref 7–45)
ANION GAP SERPL CALC-SCNC: 13 MMOL/L (ref 10–20)
AST SERPL W P-5'-P-CCNC: 27 U/L (ref 9–39)
BASOPHILS # BLD AUTO: 0.05 X10*3/UL (ref 0–0.1)
BASOPHILS NFR BLD AUTO: 0.8 %
BILIRUB SERPL-MCNC: 0.6 MG/DL (ref 0–1.2)
BUN SERPL-MCNC: 13 MG/DL (ref 6–23)
CALCIUM SERPL-MCNC: 9.4 MG/DL (ref 8.6–10.3)
CHLORIDE SERPL-SCNC: 101 MMOL/L (ref 98–107)
CHOLEST SERPL-MCNC: 155 MG/DL (ref 0–199)
CHOLESTEROL/HDL RATIO: 3.3
CO2 SERPL-SCNC: 29 MMOL/L (ref 21–32)
CREAT SERPL-MCNC: 0.77 MG/DL (ref 0.5–1.05)
EOSINOPHIL # BLD AUTO: 0.11 X10*3/UL (ref 0–0.7)
EOSINOPHIL NFR BLD AUTO: 1.7 %
ERYTHROCYTE [DISTWIDTH] IN BLOOD BY AUTOMATED COUNT: 13.2 % (ref 11.5–14.5)
GFR SERPL CREATININE-BSD FRML MDRD: 87 ML/MIN/1.73M*2
GLUCOSE SERPL-MCNC: 103 MG/DL (ref 74–99)
HCT VFR BLD AUTO: 41.3 % (ref 36–46)
HDLC SERPL-MCNC: 46.9 MG/DL
HGB BLD-MCNC: 13.8 G/DL (ref 12–16)
IMM GRANULOCYTES # BLD AUTO: 0.01 X10*3/UL (ref 0–0.7)
IMM GRANULOCYTES NFR BLD AUTO: 0.2 % (ref 0–0.9)
LDLC SERPL CALC-MCNC: 84 MG/DL
LYMPHOCYTES # BLD AUTO: 3.2 X10*3/UL (ref 1.2–4.8)
LYMPHOCYTES NFR BLD AUTO: 48.6 %
MCH RBC QN AUTO: 29.6 PG (ref 26–34)
MCHC RBC AUTO-ENTMCNC: 33.4 G/DL (ref 32–36)
MCV RBC AUTO: 89 FL (ref 80–100)
MONOCYTES # BLD AUTO: 0.39 X10*3/UL (ref 0.1–1)
MONOCYTES NFR BLD AUTO: 5.9 %
NEUTROPHILS # BLD AUTO: 2.83 X10*3/UL (ref 1.2–7.7)
NEUTROPHILS NFR BLD AUTO: 42.8 %
NON HDL CHOLESTEROL: 108 MG/DL (ref 0–149)
NRBC BLD-RTO: 0 /100 WBCS (ref 0–0)
PLATELET # BLD AUTO: 295 X10*3/UL (ref 150–450)
POTASSIUM SERPL-SCNC: 3.5 MMOL/L (ref 3.5–5.3)
PROT SERPL-MCNC: 7.8 G/DL (ref 6.4–8.2)
RBC # BLD AUTO: 4.66 X10*6/UL (ref 4–5.2)
SODIUM SERPL-SCNC: 139 MMOL/L (ref 136–145)
TRIGL SERPL-MCNC: 119 MG/DL (ref 0–149)
VLDL: 24 MG/DL (ref 0–40)
WBC # BLD AUTO: 6.6 X10*3/UL (ref 4.4–11.3)

## 2024-01-05 PROCEDURE — 85025 COMPLETE CBC W/AUTO DIFF WBC: CPT

## 2024-01-05 PROCEDURE — 36415 COLL VENOUS BLD VENIPUNCTURE: CPT

## 2024-01-05 PROCEDURE — 80053 COMPREHEN METABOLIC PANEL: CPT

## 2024-01-05 PROCEDURE — 80061 LIPID PANEL: CPT

## 2024-01-18 PROBLEM — K76.89 OTHER SPECIFIED DISEASES OF LIVER: Status: ACTIVE | Noted: 2022-11-05

## 2024-01-18 PROBLEM — H92.09 OTALGIA: Status: ACTIVE | Noted: 2023-11-21

## 2024-01-18 PROBLEM — D24.9 INTRADUCTAL PAPILLOMA OF BREAST: Status: ACTIVE | Noted: 2023-11-21

## 2024-01-18 PROBLEM — K86.2 CYST OF PANCREAS (HHS-HCC): Status: ACTIVE | Noted: 2023-06-16

## 2024-01-18 PROBLEM — Q44.6 CONGENITAL CYSTIC DISEASE OF LIVER: Status: ACTIVE | Noted: 2024-01-18

## 2024-01-18 PROBLEM — I72.8 ANEURYSM OF SPLENIC ARTERY (CMS-HCC): Status: ACTIVE | Noted: 2022-11-03

## 2024-01-18 PROBLEM — R53.83 FATIGUE: Status: ACTIVE | Noted: 2024-01-18

## 2024-01-18 PROBLEM — N28.1 CYST OF KIDNEY, ACQUIRED: Status: ACTIVE | Noted: 2023-06-16

## 2024-01-18 PROBLEM — Z78.0 ASYMPTOMATIC MENOPAUSAL STATE: Status: ACTIVE | Noted: 2022-11-04

## 2024-01-18 PROBLEM — N39.0 URINARY TRACT INFECTION: Status: ACTIVE | Noted: 2024-01-18

## 2024-01-18 PROBLEM — Z86.010 HISTORY OF ADENOMATOUS POLYP OF COLON: Status: ACTIVE | Noted: 2024-01-18

## 2024-01-18 PROBLEM — Z86.0101 HISTORY OF ADENOMATOUS POLYP OF COLON: Status: ACTIVE | Noted: 2024-01-18

## 2024-01-18 PROBLEM — D36.9: Status: ACTIVE | Noted: 2022-12-15

## 2024-01-18 PROBLEM — H02.402 UNSPECIFIED PTOSIS OF LEFT EYELID: Status: ACTIVE | Noted: 2022-11-14

## 2024-01-18 PROBLEM — I10 HYPERTENSION: Status: ACTIVE | Noted: 2022-11-23

## 2024-01-18 PROBLEM — Z91.018 HISTORY OF FOOD ALLERGY: Status: ACTIVE | Noted: 2024-01-18

## 2024-01-18 PROBLEM — M85.89 OTHER SPECIFIED DISORDERS OF BONE DENSITY AND STRUCTURE, MULTIPLE SITES: Status: ACTIVE | Noted: 2022-11-04

## 2024-01-18 PROBLEM — K63.5 SERRATED POLYP OF COLON: Status: ACTIVE | Noted: 2024-01-18

## 2024-01-18 PROBLEM — R07.9 CHEST PAIN: Status: ACTIVE | Noted: 2022-10-26

## 2024-01-18 PROBLEM — M27.9 DISORDER OF JAW: Status: ACTIVE | Noted: 2024-01-18

## 2024-01-18 PROBLEM — Z12.31 VISIT FOR SCREENING MAMMOGRAM: Status: ACTIVE | Noted: 2023-11-21

## 2024-01-18 PROBLEM — J98.11 ATELECTASIS: Status: ACTIVE | Noted: 2022-10-26

## 2024-01-18 PROBLEM — R22.9 LOCALIZED SWELLING, MASS AND LUMP, UNSPECIFIED: Status: ACTIVE | Noted: 2024-01-18

## 2024-01-18 PROBLEM — R92.8 ABNORMAL MAMMOGRAM: Status: ACTIVE | Noted: 2018-04-10

## 2024-01-18 PROBLEM — M25.569 KNEE PAIN: Status: ACTIVE | Noted: 2023-11-21

## 2024-01-18 PROBLEM — R10.9 ABDOMINAL CRAMPING: Status: ACTIVE | Noted: 2023-01-11

## 2024-01-18 RX ORDER — IBUPROFEN 200 MG
TABLET ORAL
COMMUNITY
Start: 2019-09-10

## 2024-01-18 RX ORDER — MULTIVITAMIN WITH IRON
TABLET ORAL
COMMUNITY

## 2024-01-18 RX ORDER — DICLOFENAC SODIUM 30 MG/G
GEL TOPICAL EVERY 12 HOURS
COMMUNITY
Start: 2021-02-22

## 2024-01-18 RX ORDER — FLUTICASONE PROPIONATE 50 MCG
SPRAY, SUSPENSION (ML) NASAL
COMMUNITY
Start: 2014-06-09

## 2024-01-18 RX ORDER — DICYCLOMINE HYDROCHLORIDE 20 MG/1
TABLET ORAL
COMMUNITY
Start: 2019-07-23

## 2024-01-22 ENCOUNTER — OFFICE VISIT (OUTPATIENT)
Dept: OTOLARYNGOLOGY | Facility: CLINIC | Age: 64
End: 2024-01-22
Payer: COMMERCIAL

## 2024-01-22 ENCOUNTER — CLINICAL SUPPORT (OUTPATIENT)
Dept: AUDIOLOGY | Facility: CLINIC | Age: 64
End: 2024-01-22
Payer: COMMERCIAL

## 2024-01-22 VITALS
OXYGEN SATURATION: 99 % | DIASTOLIC BLOOD PRESSURE: 93 MMHG | RESPIRATION RATE: 18 BRPM | SYSTOLIC BLOOD PRESSURE: 150 MMHG | WEIGHT: 190 LBS | HEIGHT: 66 IN | BODY MASS INDEX: 30.53 KG/M2 | HEART RATE: 68 BPM | TEMPERATURE: 97 F

## 2024-01-22 DIAGNOSIS — Z87.891 HISTORY OF TOBACCO ABUSE: ICD-10-CM

## 2024-01-22 DIAGNOSIS — G47.33 OSA (OBSTRUCTIVE SLEEP APNEA): ICD-10-CM

## 2024-01-22 DIAGNOSIS — J02.9 SORE THROAT: ICD-10-CM

## 2024-01-22 DIAGNOSIS — H92.01 RIGHT EAR PAIN: Primary | ICD-10-CM

## 2024-01-22 DIAGNOSIS — H90.3 BILATERAL SENSORINEURAL HEARING LOSS: ICD-10-CM

## 2024-01-22 DIAGNOSIS — H92.09 EAR ACHE: Primary | ICD-10-CM

## 2024-01-22 PROCEDURE — 99203 OFFICE O/P NEW LOW 30 MIN: CPT | Performed by: STUDENT IN AN ORGANIZED HEALTH CARE EDUCATION/TRAINING PROGRAM

## 2024-01-22 PROCEDURE — 3077F SYST BP >= 140 MM HG: CPT | Performed by: STUDENT IN AN ORGANIZED HEALTH CARE EDUCATION/TRAINING PROGRAM

## 2024-01-22 PROCEDURE — 1036F TOBACCO NON-USER: CPT | Performed by: STUDENT IN AN ORGANIZED HEALTH CARE EDUCATION/TRAINING PROGRAM

## 2024-01-22 PROCEDURE — 99213 OFFICE O/P EST LOW 20 MIN: CPT | Performed by: STUDENT IN AN ORGANIZED HEALTH CARE EDUCATION/TRAINING PROGRAM

## 2024-01-22 PROCEDURE — 31575 DIAGNOSTIC LARYNGOSCOPY: CPT | Performed by: STUDENT IN AN ORGANIZED HEALTH CARE EDUCATION/TRAINING PROGRAM

## 2024-01-22 PROCEDURE — 3080F DIAST BP >= 90 MM HG: CPT | Performed by: STUDENT IN AN ORGANIZED HEALTH CARE EDUCATION/TRAINING PROGRAM

## 2024-01-22 SDOH — ECONOMIC STABILITY: FOOD INSECURITY: WITHIN THE PAST 12 MONTHS, THE FOOD YOU BOUGHT JUST DIDN'T LAST AND YOU DIDN'T HAVE MONEY TO GET MORE.: NEVER TRUE

## 2024-01-22 SDOH — ECONOMIC STABILITY: FOOD INSECURITY: WITHIN THE PAST 12 MONTHS, YOU WORRIED THAT YOUR FOOD WOULD RUN OUT BEFORE YOU GOT MONEY TO BUY MORE.: NEVER TRUE

## 2024-01-22 ASSESSMENT — ENCOUNTER SYMPTOMS
DEPRESSION: 0
OCCASIONAL FEELINGS OF UNSTEADINESS: 0
LOSS OF SENSATION IN FEET: 0

## 2024-01-22 ASSESSMENT — PATIENT HEALTH QUESTIONNAIRE - PHQ9
2. FEELING DOWN, DEPRESSED OR HOPELESS: NOT AT ALL
SUM OF ALL RESPONSES TO PHQ9 QUESTIONS 1 AND 2: 0
1. LITTLE INTEREST OR PLEASURE IN DOING THINGS: NOT AT ALL

## 2024-01-22 ASSESSMENT — LIFESTYLE VARIABLES: HOW OFTEN DO YOU HAVE A DRINK CONTAINING ALCOHOL: MONTHLY OR LESS

## 2024-01-22 ASSESSMENT — COLUMBIA-SUICIDE SEVERITY RATING SCALE - C-SSRS
2. HAVE YOU ACTUALLY HAD ANY THOUGHTS OF KILLING YOURSELF?: NO
6. HAVE YOU EVER DONE ANYTHING, STARTED TO DO ANYTHING, OR PREPARED TO DO ANYTHING TO END YOUR LIFE?: NO
1. IN THE PAST MONTH, HAVE YOU WISHED YOU WERE DEAD OR WISHED YOU COULD GO TO SLEEP AND NOT WAKE UP?: NO

## 2024-01-22 ASSESSMENT — PAIN SCALES - GENERAL: PAINLEVEL: 3

## 2024-01-22 NOTE — PROGRESS NOTES
SUBJECTIVE  Patient ID: Celia Gomez is a 63 y.o. female who presents for New Patient Visit (Ear pain and sore throat).    Referred by Dr. Torres.    She reports that last summer she had loud cracking at the jaw and she had severe trismus. Since then she has not had further cracking but she notes persistent right ear pain, throat pain, and trismus. Saw her dentist who recommended a mouth guard. She was not inclined for this because she wears a good amount of equipment for JEREMY. Has had some improvement with PT. Quit smoking some 8 years ago; smoked 5-6 cigarettes a day. Over the summer was having some difficulty with swallowing; had to take smaller bites, felt food get stuck, had to use purposeful swallow - now resolved. No odynophagia, hemoptysis.    Here today with an audiogram as well.  Denies any significant noise exposure.  No known hearing loss that she is experienced.  No family history of hearing loss.  Does have 2 brothers with tinnitus.    Review of Systems  Complete ROS negative except as noted above or on patient intake form and as above.    OBJECTIVE  Physical Exam  CONSTITUTIONAL: Well appearing female who appears stated age.  PSYCHIATRIC: Alert, appropriate mood and affect.  RESPIRATORY: Normal inspiration and expiration and chest wall expansion; no use of accessory muscles to breathe.  VOICE: Clear speech without hoarseness. No stridor nor stertor.  HEAD, FACE, AND SKIN: Symmetric facial feature. No cutaneous masses or lesions were visualized. The parotid and submandibular glands were normal to palpation.  EYES: Pupils were equal in size and reactive to light. Extra-ocular muscle function was intact. No nystagmus was observed. Vision was grossly intact.  EARS: External ears were normally formed with no lesions. The external auditory canals were clear. Appears to have a small osteoma along the proximal right roof. The tympanic membranes were intact and in the neutral position. No significant  retraction pockets nor effusions were appreciated.  NOSE: Nasal dorsum was midline. Anterior rhinoscopy demonstrated a septum deviated to the left. There appears to be a compensatory jennifer bullosa on the right. Inferior turbinates were moderately hypertrophied. No obvious nasal masses, polyps, mucopurulence, nor other lesions were appreciated.  ORAL CAVITY: Lips were without lesions. Moist mucous membranes. No lesions appreciated along the gingiva, oral mucosa, nor tongue.  DENTITION: Grossly normal without obvious infection nor inflammation. Mild mandibular attrition. There is limited mouth opening secondary to discomfort. No clicking but there is asymmetry with opening. Slight tenderness with palpation of the right temporalis. Otherwise no pain on palpation of the muscles of mastication.  OROPHARYNX: No lesion nor mucosal abnormality. The uvula was normal appearing. The tonsils were 1+. No masses nor lesions appreciated on palpation of floor of mouth, base of tongue. Unable to palpate tonsillar fossae due to opening.   NECK: Visualization and palpation of the neck revealed no mass lesions, no thyromegaly or thyroid masses. No cutaneous lesions appreciated.  LYMPHATICS (CERVICAL): There were no palpable lymph nodes in the posterior triangle, submandibular triangle, jugulodigastric region, nor central neck.  NEUROLOGIC: Cranial nerves III, IV, and VI were noted to be intact via extra-ocular muscle movement testing. Cranial nerve V was noted to be intact to soft touch bilaterally. Cranial nerve VII was noted to be intact and symmetric by facial movement. Cranial nerve VIII was tested with normal voice examination and revealed grossly normal hearing. Cranial nerves IX and X noted to be intact by palatal movement. Cranial nerve XI noted to be intact via shoulder shrug.  Cranial nerve XII noted to be intact with active and symmetric tongue movement.     --------------------------------------------------  Audiology:  I  personally reviewed the patient's audiogram from today.  This demonstrated normal hearing with a mild high-frequency sensorineural hearing loss bilaterally.  She had excellent word recognition scores and type A tympanograms.   --------------------------------------------------    --------------------------------------------------  Procedure: Flexible Laryngoscopy - Diagnostic  Indication: otalgia, throat pain, history of tobacco abuse  Informed consent verbally obtained: The risks, benefits, alternatives, and expectations were discussed with the patient, who wished to proceed  Anesthesia: Topical lidocaine/oxymetazoline    Findings: After topical anesthetic was applied the flexible endoscope was advanced into the naris. The nasal cavity, nasopharynx, oropharynx, hypopharynx, and larynx were evaluated and were normal with the following significant findings or exceptions:    - Moderate adenoid pad present - no lesions of the Fossae of [place]  - No other masses/lesions appreciated  - Normal and symmetric TVC motion bilaterally  - Good closure without glottal gap  - No significant pharyngeal, supraglottic, nor glottic edema  - No pooling of secretions    There were no complications and the patient tolerated the procedure well.  --------------------------------------------------    ASSESSMENT/PLAN  Diagnoses and all orders for this visit:  Right ear pain  Sore throat  Bilateral sensorineural hearing loss    63 y.o. female who initially presented with several concerns.    Right otalgia, sore throat, suspected TMJ dysfunction  The patient reports that starting summer 2023 she began having significant right-sided otalgia following an episode of jaw cracking.  This is resulted in significant trismus and general discomfort.  Her dentist recommended she trial a nightguard but she cannot use this because of significant equipment for obstructive sleep apnea.  She has noted some improvement with physical therapy.  Because of  a history of tobacco abuse and sore throat, her PCP recommended evaluation with ENT.  On my exam I appreciate no concerning findings other than what has been previously noted by her dentist.  I appreciated no concerning lesions in the oropharynx nor flexible laryngoscopy.  The patient does appear to have a small osteoma along the roof of the right ear canal, but this is too small to be any source of significant discomfort.    Significant reassurance was provided to the patient.  I appreciate no concerning masses that require further workup.  I recommended the patient continue to work with her dentist and any referred oral surgeon who can help with TMJ discomfort.    Bilateral high frequency SNHL  The patient also presented with an audiogram that showed a very mild high-frequency sensorineural hearing loss bilaterally.  This would also not be the source of the patient's otalgia.  Reassurance was provided to the patient.  The etiologies of hearing loss were discussed with the patient. We discussed how there is no true treatment for hearing loss.  I advised the patient to protect their remaining hearing.  For now I recommended observation with follow-up audiograms as needed.    She will follow-up with me as needed.    This note was created using speech recognition transcription software. Despite proofreading, typographical errors may be present that affect the meaning of the content. Please contact my office with any questions.

## 2024-01-22 NOTE — PROGRESS NOTES
"AUDIOLOGY ADULT AUDIOMETRIC EVALUATION      Name:  Celia Gomez  :  1960  Age:  63 y.o.  Date of Evaluation:  2024    HISTORY  Reason for visit:  ear pain  Ms. Gomez is seen 2024 at the request of Gonzales Olmos M.D. for an evaluation of hearing.      Chief complaint:    Right-sided Jaw,  ear, throat pain    Hearing loss:  no concerns  Tinnitus:   occasional ringing, unsure of ear; sometimes bothersome  Otitis Media: denies  Otologic surgical history:  denies  Dizziness/imbalance:  occasional imbalance, e.g. when standing up; history of vertigo with virus about 10 years ago  Otalgia:  right ear pain  Ear pressure/fullness:  right ear  History of excessive noise exposure:  denies  Other: history of sleep apnea     Hearing aid history: denies          EVALUATION  Please find audiogram in \"Media\" tab (Document Type:  Audiology Report).    RESULTS   Otoscopic Evaluation: clear canals bilaterally      Immittance Measures (226 Hz probe tone):   Tympanometry is consistent with normal middle ear pressure and normal tympanic membrane mobility bilaterally.        Ipsilateral acoustic reflexes (500-4000 Hz) are present for the right ear for 500-4000 Hz and present for the left ear for 3293-6122 Hz (absent 500 Hz).      Test technique:  standard behavioral technique via TDH earphones.  Reliability is good.    Pure Tone Audiometry:  Hearing sensitivity is in the normal hearing to mild hearing loss range.       Speech Audiometry:        Right Ear:  Speech Reception Threshold (SRT) was obtained at 10 dBHL                 Speech discrimination score was 100% in quiet when words were presented at 60 dBHL      Left Ear:  Speech Reception Threshold (SRT) was obtained at 10 dBHL                 Speech discrimination score was 100% in quiet when words were presented at 60 dBHL    IMPRESSIONS:  Patient is expected to have communication difficulty in adverse listening environments.    Patient is expected to " benefit from effective communication strategies.       RECOMMENDATIONS  Continue with ENT follow-up with Gonzales Olmos M.D.   Reassess hearing in 1 year (or sooner if medically indicated or if there is a concern for a change in hearing).    Continue with medical follow-up as indicated.       PATIENT EDUCATION  Discussed results and recommendations with patient.  Questions were addressed and the patient was encouraged to contact our department should concerns arise.       FABIANA Foss, CCC-A  Licensed Audiologist

## 2024-01-22 NOTE — LETTER
January 23, 2024     Carrie Torres MD  [address]  [facility] [address]    Patient: Celia Gomez   YOB: 1960   Date of Visit: 1/22/2024       Dear Dr. Carrie Torres MD:    Thank you for referring Celia Gomez to me for evaluation. Below are my notes for this consultation.  If you have questions, please do not hesitate to call me. I look forward to following your patient along with you.       Sincerely,     Gonzales Olmos MD      CC: No Recipients  ______________________________________________________________________________________    SUBJECTIVE  Patient ID: Celia Gomez is a 63 y.o. female who presents for New Patient Visit (Ear pain and sore throat).    Referred by Dr. Torres.    She reports that last summer she had loud cracking at the jaw and she had severe trismus. Since then she has not had further cracking but she notes persistent right ear pain, throat pain, and trismus. Saw her dentist who recommended a mouth guard. She was not inclined for this because she wears a good amount of equipment for [employer]. Has had some improvement with PT. Quit smoking some 8 years ago; smoked 5-6 cigarettes a day. Over the summer was having some difficulty with swallowing; had to take smaller bites, felt food get stuck, had to use purposeful swallow - now resolved. No odynophagia, hemoptysis.    Here today with an audiogram as well.  Denies any significant noise exposure.  No known hearing loss that she is experienced.  No family history of hearing loss.  Does have 2 brothers with tinnitus.    Review of Systems  Complete ROS negative except as noted above or on patient intake form and as above.    OBJECTIVE  Physical Exam  CONSTITUTIONAL: Well appearing female who appears stated age.  PSYCHIATRIC: Alert, appropriate mood and affect.  RESPIRATORY: Normal inspiration and expiration and chest wall expansion; no use of accessory muscles to breathe.  VOICE: Clear speech without  hoarseness. No stridor nor stertor.  HEAD, FACE, AND SKIN: Symmetric facial feature. No cutaneous masses or lesions were visualized. The parotid and submandibular glands were normal to palpation.  EYES: Pupils were equal in size and reactive to light. Extra-ocular muscle function was intact. No nystagmus was observed. Vision was grossly intact.  EARS: External ears were normally formed with no lesions. The external auditory canals were clear. Appears to have a small osteoma along the proximal right roof. The tympanic membranes were intact and in the neutral position. No significant retraction pockets nor effusions were appreciated.  NOSE: Nasal dorsum was midline. Anterior rhinoscopy demonstrated a septum deviated to the left. There appears to be a compensatory jennifer bullosa on the right. Inferior turbinates were moderately hypertrophied. No obvious nasal masses, polyps, mucopurulence, nor other lesions were appreciated.  ORAL CAVITY: Lips were without lesions. Moist mucous membranes. No lesions appreciated along the gingiva, oral mucosa, nor tongue.  DENTITION: Grossly normal without obvious infection nor inflammation. Mild mandibular attrition. There is limited mouth opening secondary to discomfort. No clicking but there is asymmetry with opening. Slight tenderness with palpation of the right temporalis. Otherwise no pain on palpation of the muscles of mastication.  OROPHARYNX: No lesion nor mucosal abnormality. The uvula was normal appearing. The tonsils were 1+. No masses nor lesions appreciated on palpation of floor of mouth, base of tongue. Unable to palpate tonsillar fossae due to opening.   NECK: Visualization and palpation of the neck revealed no mass lesions, no thyromegaly or thyroid masses. No cutaneous lesions appreciated.  LYMPHATICS (CERVICAL): There were no palpable lymph nodes in the posterior triangle, submandibular triangle, jugulodigastric region, nor central neck.  NEUROLOGIC: Cranial nerves  III, IV, and VI were noted to be intact via extra-ocular muscle movement testing. Cranial nerve V was noted to be intact to soft touch bilaterally. Cranial nerve VII was noted to be intact and symmetric by facial movement. Cranial nerve VIII was tested with normal voice examination and revealed grossly normal hearing. Cranial nerves IX and X noted to be intact by palatal movement. Cranial nerve XI noted to be intact via shoulder shrug.  Cranial nerve XII noted to be intact with active and symmetric tongue movement.     --------------------------------------------------  Audiology:  I personally reviewed the patient's audiogram from today.  This demonstrated normal hearing with a mild high-frequency sensorineural hearing loss bilaterally.  She had excellent word recognition scores and type A tympanograms.   --------------------------------------------------    --------------------------------------------------  Procedure: Flexible Laryngoscopy - Diagnostic  Indication: otalgia, throat pain, history of tobacco abuse  Informed consent verbally obtained: The risks, benefits, alternatives, and expectations were discussed with the patient, who wished to proceed  Anesthesia: Topical lidocaine/oxymetazoline    Findings: After topical anesthetic was applied the flexible endoscope was advanced into the naris. The nasal cavity, nasopharynx, oropharynx, hypopharynx, and larynx were evaluated and were normal with the following significant findings or exceptions:    - Moderate adenoid pad present - no lesions of the Fossae of [place]muller  - No other masses/lesions appreciated  - Normal and symmetric TVC motion bilaterally  - Good closure without glottal gap  - No significant pharyngeal, supraglottic, nor glottic edema  - No pooling of secretions    There were no complications and the patient tolerated the procedure well.  --------------------------------------------------    ASSESSMENT/PLAN  Diagnoses and all orders for this  visit:  Right ear pain  Sore throat  Bilateral sensorineural hearing loss    63 y.o. female who initially presented with several concerns.    Right otalgia, sore throat, suspected TMJ dysfunction  The patient reports that starting summer 2023 she began having significant right-sided otalgia following an episode of jaw cracking.  This is resulted in significant trismus and general discomfort.  Her dentist recommended she trial a nightguard but she cannot use this because of significant equipment for obstructive sleep apnea.  She has noted some improvement with physical therapy.  Because of a history of tobacco abuse and sore throat, her PCP recommended evaluation with ENT.  On my exam I appreciate no concerning findings other than what has been previously noted by her dentist.  I appreciated no concerning lesions in the oropharynx nor flexible laryngoscopy.  The patient does appear to have a small osteoma along the roof of the right ear canal, but this is too small to be any source of significant discomfort.    Significant reassurance was provided to the patient.  I appreciate no concerning masses that require further workup.  I recommended the patient continue to work with her dentist and any referred oral surgeon who can help with TMJ discomfort.    Bilateral high frequency SNHL  The patient also presented with an audiogram that showed a very mild high-frequency sensorineural hearing loss bilaterally.  This would also not be the source of the patient's otalgia.  Reassurance was provided to the patient.  The etiologies of hearing loss were discussed with the patient. We discussed how there is no true treatment for hearing loss.  I advised the patient to protect their remaining hearing.  For now I recommended observation with follow-up audiograms as needed.    She will follow-up with me as needed.    This note was created using speech recognition transcription software. Despite proofreading, typographical errors may  be present that affect the meaning of the content. Please contact my office with any questions.

## 2024-02-01 ENCOUNTER — OFFICE VISIT (OUTPATIENT)
Dept: OBSTETRICS AND GYNECOLOGY | Facility: CLINIC | Age: 64
End: 2024-02-01
Payer: COMMERCIAL

## 2024-02-01 VITALS
WEIGHT: 186.13 LBS | SYSTOLIC BLOOD PRESSURE: 130 MMHG | HEIGHT: 66 IN | BODY MASS INDEX: 29.91 KG/M2 | DIASTOLIC BLOOD PRESSURE: 80 MMHG

## 2024-02-01 DIAGNOSIS — Z01.419 WELL WOMAN EXAM WITH ROUTINE GYNECOLOGICAL EXAM: Primary | ICD-10-CM

## 2024-02-01 DIAGNOSIS — Z12.31 VISIT FOR SCREENING MAMMOGRAM: ICD-10-CM

## 2024-02-01 PROCEDURE — 3075F SYST BP GE 130 - 139MM HG: CPT | Performed by: OBSTETRICS & GYNECOLOGY

## 2024-02-01 PROCEDURE — 3079F DIAST BP 80-89 MM HG: CPT | Performed by: OBSTETRICS & GYNECOLOGY

## 2024-02-01 PROCEDURE — 1036F TOBACCO NON-USER: CPT | Performed by: OBSTETRICS & GYNECOLOGY

## 2024-02-01 PROCEDURE — 99396 PREV VISIT EST AGE 40-64: CPT | Performed by: OBSTETRICS & GYNECOLOGY

## 2024-02-01 ASSESSMENT — LIFESTYLE VARIABLES
HOW OFTEN DO YOU HAVE SIX OR MORE DRINKS ON ONE OCCASION: LESS THAN MONTHLY
AUDIT-C TOTAL SCORE: 2
HOW OFTEN DO YOU HAVE A DRINK CONTAINING ALCOHOL: MONTHLY OR LESS
SKIP TO QUESTIONS 9-10: 0
HOW MANY STANDARD DRINKS CONTAINING ALCOHOL DO YOU HAVE ON A TYPICAL DAY: 1 OR 2

## 2024-02-01 NOTE — PROGRESS NOTES
Subjective   Patient ID: Celia Gomez is a 63 y.o. female who presents for Annual Exam.    Last pap: 8/8/22, normal HPV-  Last mamm: 11/9/23, ordered for this year  LMP: absent  Contraception: menopause  Sexually active: yes  Self breast exam: yes  Diet: balanced  Exercise: yes    HPI  Doing well. No complaints.     Review of Systems  neg  Objective   Physical Exam  Physical Exam         Appearance: Normal appearance. Affect normal and alert  Pulmonary:      Effort: Pulmonary effort is normal. Breath sounds clear  Skin: no rashes or lesions   Breasts:     Breasts bilaterally are symmetrical. No masses or axillary adenopathy. No skin or nipple changes    Abdominal:     Abdomen is flat, soft, nontender. No distension. No mass palpated.      Genitourinary:     Labia: no skin lesions or rash       Urethra: No lesions.      Bladder with no prolapse     Vagina: No discharge, mucosa is pink with no lesions.     Cervix:    mobile and nontender no discharge     Uterus:   Not enlarged, small, nontender.      Adnexa: Bilaterally with  No mass or tenderness.            Extremities:  Nontender, no edema. Normal range of motion    Assessment/Plan   Diagnoses and all orders for this visit:  Well woman exam with routine gynecological exam  Visit for screening mammogram  -     BI mammo bilateral screening tomosynthesis; Future         Donna Martínez MD

## 2024-02-15 DIAGNOSIS — M79.7 FIBROMYALGIA: ICD-10-CM

## 2024-02-15 RX ORDER — DULOXETIN HYDROCHLORIDE 60 MG/1
60 CAPSULE, DELAYED RELEASE ORAL DAILY
Qty: 90 CAPSULE | Refills: 1 | Status: SHIPPED | OUTPATIENT
Start: 2024-02-15

## 2024-03-26 DIAGNOSIS — I10 HTN (HYPERTENSION), BENIGN: ICD-10-CM

## 2024-03-26 DIAGNOSIS — E78.5 HYPERLIPIDEMIA, UNSPECIFIED HYPERLIPIDEMIA TYPE: ICD-10-CM

## 2024-03-26 RX ORDER — HYDROCHLOROTHIAZIDE 25 MG/1
25 TABLET ORAL DAILY
Qty: 90 TABLET | Refills: 1 | Status: SHIPPED | OUTPATIENT
Start: 2024-03-26 | End: 2024-09-22

## 2024-03-26 RX ORDER — ROSUVASTATIN CALCIUM 5 MG/1
5 TABLET, COATED ORAL DAILY
Qty: 90 TABLET | Refills: 1 | Status: SHIPPED | OUTPATIENT
Start: 2024-03-26 | End: 2024-09-22

## 2024-05-01 ENCOUNTER — APPOINTMENT (OUTPATIENT)
Dept: RADIOLOGY | Facility: HOSPITAL | Age: 64
End: 2024-05-01
Payer: COMMERCIAL

## 2024-05-09 ENCOUNTER — APPOINTMENT (OUTPATIENT)
Dept: VASCULAR SURGERY | Facility: CLINIC | Age: 64
End: 2024-05-09
Payer: COMMERCIAL

## 2024-07-14 ENCOUNTER — APPOINTMENT (OUTPATIENT)
Dept: RADIOLOGY | Facility: HOSPITAL | Age: 64
End: 2024-07-14
Payer: COMMERCIAL

## 2024-07-28 ENCOUNTER — APPOINTMENT (OUTPATIENT)
Dept: RADIOLOGY | Facility: HOSPITAL | Age: 64
End: 2024-07-28
Payer: COMMERCIAL

## 2024-08-03 ENCOUNTER — HOSPITAL ENCOUNTER (OUTPATIENT)
Dept: RADIOLOGY | Facility: HOSPITAL | Age: 64
Discharge: HOME | End: 2024-08-03
Payer: COMMERCIAL

## 2024-08-03 DIAGNOSIS — C25.9 MALIGNANT NEOPLASM OF PANCREAS, UNSPECIFIED (MULTI): ICD-10-CM

## 2024-08-03 PROCEDURE — 76376 3D RENDER W/INTRP POSTPROCES: CPT | Performed by: RADIOLOGY

## 2024-08-03 PROCEDURE — A9575 INJ GADOTERATE MEGLUMI 0.1ML: HCPCS

## 2024-08-03 PROCEDURE — 2500000004 HC RX 250 GENERAL PHARMACY W/ HCPCS (ALT 636 FOR OP/ED)

## 2024-08-03 PROCEDURE — 74183 MRI ABD W/O CNTR FLWD CNTR: CPT

## 2024-08-03 PROCEDURE — 74183 MRI ABD W/O CNTR FLWD CNTR: CPT | Performed by: RADIOLOGY

## 2024-08-03 RX ORDER — GADOTERATE MEGLUMINE 376.9 MG/ML
17 INJECTION INTRAVENOUS
Status: COMPLETED | OUTPATIENT
Start: 2024-08-03 | End: 2024-08-03

## 2024-08-03 RX ADMIN — GADOTERATE MEGLUMINE 17 ML: 376.9 INJECTION INTRAVENOUS at 17:06

## 2024-08-15 ENCOUNTER — TELEMEDICINE (OUTPATIENT)
Dept: VASCULAR SURGERY | Facility: CLINIC | Age: 64
End: 2024-08-15
Payer: COMMERCIAL

## 2024-08-15 DIAGNOSIS — I72.8 ANEURYSM OF SPLENIC ARTERY (CMS-HCC): Primary | ICD-10-CM

## 2024-08-15 PROCEDURE — 99212 OFFICE O/P EST SF 10 MIN: CPT | Performed by: SURGERY

## 2024-08-15 NOTE — PROGRESS NOTES
Vascular Surgery Clinic Note    CC: KAY    HPI:  Celia Gomez is 63 y.o. female with history of KAY. Recent MRI shows stable 1.6cm KAY. She has no symptoms.     Medical History:   has a past medical history of Allergy to other foods (10/20/2016), Asthma (HHS-HCC), Chondrocostal junction syndrome (tietze), Disease of jaws, unspecified, Encounter for general adult medical examination without abnormal findings (09/10/2019), Encounter for screening mammogram for malignant neoplasm of breast, Environmental allergies, HTN (hypertension), Other conditions influencing health status (10/20/2016), Other conditions influencing health status (11/21/2020), Other conditions influencing health status (12/26/2019), Personal history of colonic polyps, Personal history of other diseases of the circulatory system (11/09/2022), Personal history of other diseases of the circulatory system (06/22/2022), Personal history of other diseases of the digestive system, Personal history of other diseases of the musculoskeletal system and connective tissue, Personal history of other diseases of the respiratory system, Personal history of other specified conditions (11/17/2022), Polyp of colon (09/09/2020), and Sleep apnea.    Meds:   Current Outpatient Medications on File Prior to Visit   Medication Sig Dispense Refill    cholecalciferol (Vitamin D-3) 25 MCG (1000 UT) tablet Take 1 tablet (1,000 Units) by mouth once daily.      cyanocobalamin (Vitamin B-12) 500 mcg tablet Take 1 tablet (500 mcg) by mouth once daily.      diclofenac sodium 3 % gel every 12 hours.      dicyclomine (Bentyl) 20 mg tablet Take by mouth.      DULoxetine (Cymbalta) 60 mg DR capsule Take 1 capsule (60 mg) by mouth once daily. 90 capsule 1    fluticasone (Flonase) 50 mcg/actuation nasal spray Administer into affected nostril(s) once daily.      hydroCHLOROthiazide (HYDRODiuril) 25 mg tablet Take 1 tablet (25 mg) by mouth once daily. 90 tablet 1    ibuprofen 200 mg  tablet Take by mouth.      magnesium oxide 400 mg magnesium capsule Take 1 capsule (400 mg) by mouth once daily.      multivit-min/ferrous fumarate (MULTI VITAMIN ORAL) Take 1 tablet by mouth once daily.      multivitamin (Multiple Vitamins) tablet Take by mouth once daily.      pantoprazole (ProtoNix) 40 mg EC tablet Take 1 tablet (40 mg) by mouth once daily. Do not crush, chew, or split. 90 tablet 1    rosuvastatin (Crestor) 5 mg tablet Take 1 tablet (5 mg) by mouth once daily. 90 tablet 1    TURMERIC ORAL Take 1 tablet by mouth once daily.       No current facility-administered medications on file prior to visit.        Allergies:   Allergies   Allergen Reactions    Fruit Flavor Other    Mold Other       SH:    Social Determinants of Health     Tobacco Use: Medium Risk (1/22/2024)    Patient History     Smoking Tobacco Use: Former     Smokeless Tobacco Use: Never     Passive Exposure: Not on file   Alcohol Use: Not At Risk (2/1/2024)    AUDIT-C     Frequency of Alcohol Consumption: Monthly or less     Average Number of Drinks: 1 or 2     Frequency of Binge Drinking: Less than monthly   Financial Resource Strain: Not on file   Food Insecurity: No Food Insecurity (1/22/2024)    Hunger Vital Sign     Worried About Running Out of Food in the Last Year: Never true     Ran Out of Food in the Last Year: Never true   Transportation Needs: Not on file   Physical Activity: Not on file   Stress: Not on file   Social Connections: Not on file   Intimate Partner Violence: Not on file   Depression: Not at risk (1/22/2024)    PHQ-2     PHQ-2 Score: 0   Housing Stability: Not on file   Utilities: Not on file   Digital Equity: Not on file   Health Literacy: Not on file        FH:  Family History   Problem Relation Name Age of Onset    Breast cancer Maternal Grandmother  60        ROS:  All systems were reviewed and are negative except as per HPI.  Assessment & Plan:  Celia Gomez is 63 y.o. female with stable small KAY. I  recommended surveillance in 3 years.       I spent a total of 10 minutes on the day of the visit.         Jaskaran Penn M.D.

## 2024-08-20 ENCOUNTER — HOSPITAL ENCOUNTER (OUTPATIENT)
Dept: GASTROENTEROLOGY | Facility: HOSPITAL | Age: 64
Setting detail: OUTPATIENT SURGERY
Discharge: HOME | End: 2024-08-20
Payer: COMMERCIAL

## 2024-08-20 ENCOUNTER — ANESTHESIA EVENT (OUTPATIENT)
Dept: GASTROENTEROLOGY | Facility: HOSPITAL | Age: 64
End: 2024-08-20
Payer: COMMERCIAL

## 2024-08-20 ENCOUNTER — ANESTHESIA (OUTPATIENT)
Dept: GASTROENTEROLOGY | Facility: HOSPITAL | Age: 64
End: 2024-08-20
Payer: COMMERCIAL

## 2024-08-20 VITALS
DIASTOLIC BLOOD PRESSURE: 72 MMHG | RESPIRATION RATE: 18 BRPM | SYSTOLIC BLOOD PRESSURE: 124 MMHG | HEART RATE: 71 BPM | HEIGHT: 66 IN | TEMPERATURE: 97.3 F | OXYGEN SATURATION: 98 % | BODY MASS INDEX: 28.93 KG/M2 | WEIGHT: 180 LBS

## 2024-08-20 DIAGNOSIS — K86.2 PANCREATIC CYST (HHS-HCC): Primary | ICD-10-CM

## 2024-08-20 LAB
AMYLASE FLD-CCNC: >6000 U/L
CEA FLD-MCNC: 74.5 UG/L
GLUCOSE FLD-MCNC: <10 MG/DL

## 2024-08-20 PROCEDURE — 2720000007 HC OR 272 NO HCPCS

## 2024-08-20 PROCEDURE — 2500000004 HC RX 250 GENERAL PHARMACY W/ HCPCS (ALT 636 FOR OP/ED): Performed by: INTERNAL MEDICINE

## 2024-08-20 PROCEDURE — 7100000010 HC PHASE TWO TIME - EACH INCREMENTAL 1 MINUTE

## 2024-08-20 PROCEDURE — 82150 ASSAY OF AMYLASE: CPT | Mod: STJLAB | Performed by: INTERNAL MEDICINE

## 2024-08-20 PROCEDURE — 82945 GLUCOSE OTHER FLUID: CPT | Mod: STJLAB | Performed by: INTERNAL MEDICINE

## 2024-08-20 PROCEDURE — 3700000002 HC GENERAL ANESTHESIA TIME - EACH INCREMENTAL 1 MINUTE

## 2024-08-20 PROCEDURE — 2500000004 HC RX 250 GENERAL PHARMACY W/ HCPCS (ALT 636 FOR OP/ED): Performed by: ANESTHESIOLOGIST ASSISTANT

## 2024-08-20 PROCEDURE — 43238 EGD US FINE NEEDLE BX/ASPIR: CPT | Performed by: INTERNAL MEDICINE

## 2024-08-20 PROCEDURE — 82378 CARCINOEMBRYONIC ANTIGEN: CPT | Mod: STJLAB | Performed by: INTERNAL MEDICINE

## 2024-08-20 PROCEDURE — 3700000001 HC GENERAL ANESTHESIA TIME - INITIAL BASE CHARGE

## 2024-08-20 PROCEDURE — 7100000009 HC PHASE TWO TIME - INITIAL BASE CHARGE

## 2024-08-20 RX ORDER — MIDAZOLAM HYDROCHLORIDE 1 MG/ML
1 INJECTION, SOLUTION INTRAMUSCULAR; INTRAVENOUS ONCE AS NEEDED
Status: DISCONTINUED | OUTPATIENT
Start: 2024-08-20 | End: 2024-08-21 | Stop reason: HOSPADM

## 2024-08-20 RX ORDER — CIPROFLOXACIN 2 MG/ML
INJECTION, SOLUTION INTRAVENOUS AS NEEDED
Status: DISCONTINUED | OUTPATIENT
Start: 2024-08-20 | End: 2024-08-20

## 2024-08-20 RX ORDER — SODIUM CHLORIDE, SODIUM LACTATE, POTASSIUM CHLORIDE, CALCIUM CHLORIDE 600; 310; 30; 20 MG/100ML; MG/100ML; MG/100ML; MG/100ML
20 INJECTION, SOLUTION INTRAVENOUS CONTINUOUS
Status: DISCONTINUED | OUTPATIENT
Start: 2024-08-20 | End: 2024-08-21 | Stop reason: HOSPADM

## 2024-08-20 RX ORDER — GLYCOPYRROLATE 0.2 MG/ML
INJECTION INTRAMUSCULAR; INTRAVENOUS AS NEEDED
Status: DISCONTINUED | OUTPATIENT
Start: 2024-08-20 | End: 2024-08-20

## 2024-08-20 RX ORDER — OXYCODONE HYDROCHLORIDE 5 MG/1
5 TABLET ORAL EVERY 4 HOURS PRN
Status: DISCONTINUED | OUTPATIENT
Start: 2024-08-20 | End: 2024-08-21 | Stop reason: HOSPADM

## 2024-08-20 RX ORDER — SODIUM CHLORIDE, SODIUM LACTATE, POTASSIUM CHLORIDE, CALCIUM CHLORIDE 600; 310; 30; 20 MG/100ML; MG/100ML; MG/100ML; MG/100ML
100 INJECTION, SOLUTION INTRAVENOUS CONTINUOUS
Status: DISCONTINUED | OUTPATIENT
Start: 2024-08-20 | End: 2024-08-21 | Stop reason: HOSPADM

## 2024-08-20 RX ORDER — ONDANSETRON HYDROCHLORIDE 2 MG/ML
4 INJECTION, SOLUTION INTRAVENOUS ONCE AS NEEDED
Status: DISCONTINUED | OUTPATIENT
Start: 2024-08-20 | End: 2024-08-21 | Stop reason: HOSPADM

## 2024-08-20 RX ORDER — LABETALOL HYDROCHLORIDE 5 MG/ML
5 INJECTION, SOLUTION INTRAVENOUS ONCE AS NEEDED
Status: DISCONTINUED | OUTPATIENT
Start: 2024-08-20 | End: 2024-08-21 | Stop reason: HOSPADM

## 2024-08-20 RX ORDER — FENTANYL CITRATE 50 UG/ML
INJECTION, SOLUTION INTRAMUSCULAR; INTRAVENOUS AS NEEDED
Status: DISCONTINUED | OUTPATIENT
Start: 2024-08-20 | End: 2024-08-20

## 2024-08-20 RX ORDER — LIDOCAINE HYDROCHLORIDE 10 MG/ML
0.1 INJECTION INFILTRATION; PERINEURAL ONCE
Status: DISCONTINUED | OUTPATIENT
Start: 2024-08-20 | End: 2024-08-21 | Stop reason: HOSPADM

## 2024-08-20 RX ORDER — FENTANYL CITRATE 50 UG/ML
25 INJECTION, SOLUTION INTRAMUSCULAR; INTRAVENOUS EVERY 5 MIN PRN
Status: DISCONTINUED | OUTPATIENT
Start: 2024-08-20 | End: 2024-08-21 | Stop reason: HOSPADM

## 2024-08-20 RX ORDER — MEPERIDINE HYDROCHLORIDE 50 MG/ML
12.5 INJECTION INTRAMUSCULAR; INTRAVENOUS; SUBCUTANEOUS EVERY 10 MIN PRN
Status: DISCONTINUED | OUTPATIENT
Start: 2024-08-20 | End: 2024-08-21 | Stop reason: HOSPADM

## 2024-08-20 RX ORDER — ALBUTEROL SULFATE 0.83 MG/ML
2.5 SOLUTION RESPIRATORY (INHALATION) ONCE AS NEEDED
Status: DISCONTINUED | OUTPATIENT
Start: 2024-08-20 | End: 2024-08-21 | Stop reason: HOSPADM

## 2024-08-20 RX ORDER — PROPOFOL 10 MG/ML
INJECTION, EMULSION INTRAVENOUS AS NEEDED
Status: DISCONTINUED | OUTPATIENT
Start: 2024-08-20 | End: 2024-08-20

## 2024-08-20 SDOH — HEALTH STABILITY: MENTAL HEALTH: CURRENT SMOKER: 0

## 2024-08-20 ASSESSMENT — PAIN - FUNCTIONAL ASSESSMENT
PAIN_FUNCTIONAL_ASSESSMENT: 0-10

## 2024-08-20 ASSESSMENT — PAIN SCALES - GENERAL
PAINLEVEL_OUTOF10: 0 - NO PAIN
PAIN_LEVEL: 0

## 2024-08-20 NOTE — DISCHARGE INSTRUCTIONS

## 2024-08-20 NOTE — ANESTHESIA POSTPROCEDURE EVALUATION
Patient: Celia Gomez    Procedure Summary       Date: 08/20/24 Room / Location: [facility] [place]    Anesthesia Start: 0738 Anesthesia Stop: 0806    Procedure: ENDOSCOPIC ULTRASOUND (UPPER) Diagnosis:       Pancreatic cyst (HHS-HCC)      Pancreatic cyst (HHS-HCC)    Scheduled Providers: Jeff Khan MD Responsible Provider: Rigo Delaney DO    Anesthesia Type: MAC ASA Status: Not recorded            Anesthesia Type: MAC    Vitals Value Taken Time   /70 08/20/24 0806   Temp 36 08/20/24 0806   Pulse 82 08/20/24 0806   Resp 13 08/20/24 0806   SpO2 97 08/20/24 0806       Anesthesia Post Evaluation    Patient location during evaluation: PACU  Patient participation: complete - patient cannot participate  Level of consciousness: sleepy but conscious  Pain score: 0  Pain management: adequate  There was medical reason for not using a multimodal analgesia pain management approach.Airway patency: patent  Two or more strategies used to mitigate risk of obstructive sleep apnea  Cardiovascular status: acceptable and stable  Respiratory status: acceptable and room air  Hydration status: acceptable  Postoperative Nausea and Vomiting: none        No notable events documented.

## 2024-08-20 NOTE — ANESTHESIA PREPROCEDURE EVALUATION
Patient: Celia Gomez    Procedure Information       Anesthesia Start Date/Time: 08/20/24 0738    Scheduled providers: Jeff Khan MD    Procedure: ENDOSCOPIC ULTRASOUND (UPPER)    Location: [facility] [place]            Relevant Problems   Cardiac   (+) Chest pain   (+) Hypertension      Pulmonary   (+) Exercise-induced asthma (HHS-HCC)   (+) Obstructive sleep apnea syndrome      GI   (+) Chronic diarrhea   (+) Gastroesophageal reflux disease      /Renal   (+) Urinary tract infection      Liver   (+) Congenital cystic disease of liver   (+) Other specified diseases of liver   (+) Burrell syndrome (Multi)      Hematology   (+) Anemia      Musculoskeletal   (+) Fibromyalgia   (+) Osteoarthritis of spine with radiculopathy, cervical region   (+) Spondylosis of cervical spine      ID   (+) Bacterial conjunctivitis   (+) URTI (acute upper respiratory infection)   (+) Urinary tract infection       Clinical information reviewed:   Tobacco  Allergies  Meds   Med Hx  Surg Hx   Fam Hx  Soc Hx        NPO Detail:  NPO/Void Status  Date of Last Liquid: 08/19/24  Time of Last Liquid: 2200  Date of Last Solid: 08/19/24  Time of Last Solid: 2130  Time of Last Void: 0500         Physical Exam    Airway  Mallampati: II  TM distance: >3 FB     Cardiovascular - normal exam  Rhythm: regular  Rate: normal     Dental - normal exam     Pulmonary - normal exam     Abdominal - normal exam  Abdomen: soft             Anesthesia Plan    History of general anesthesia?: yes  History of complications of general anesthesia?: no    ASA 2     MAC     The patient is not a current smoker.  Patient was previously instructed to abstain from smoking on day of procedure.  Patient did not smoke on day of procedure.  Education provided regarding risk of obstructive sleep apnea.  intravenous induction   Postoperative administration of opioids is intended.  Anesthetic plan and risks discussed with patient.  Use of blood products  discussed with patient who.    Plan discussed with CAA.

## 2024-08-22 LAB
LABORATORY COMMENT REPORT: NORMAL
LABORATORY COMMENT REPORT: NORMAL
PATH REPORT.FINAL DX SPEC: NORMAL
PATH REPORT.GROSS SPEC: NORMAL
PATH REPORT.RELEVANT HX SPEC: NORMAL
PATH REPORT.TOTAL CANCER: NORMAL

## 2024-09-06 ENCOUNTER — APPOINTMENT (OUTPATIENT)
Dept: GASTROENTEROLOGY | Facility: CLINIC | Age: 64
End: 2024-09-06
Payer: COMMERCIAL

## 2024-09-06 VITALS
HEART RATE: 85 BPM | HEIGHT: 66 IN | DIASTOLIC BLOOD PRESSURE: 90 MMHG | OXYGEN SATURATION: 95 % | WEIGHT: 186 LBS | SYSTOLIC BLOOD PRESSURE: 145 MMHG | BODY MASS INDEX: 29.89 KG/M2

## 2024-09-06 DIAGNOSIS — Z86.010 HISTORY OF COLON POLYPS: ICD-10-CM

## 2024-09-06 DIAGNOSIS — K86.2 PANCREATIC CYST (HHS-HCC): Primary | ICD-10-CM

## 2024-09-06 PROCEDURE — 3008F BODY MASS INDEX DOCD: CPT | Performed by: INTERNAL MEDICINE

## 2024-09-06 PROCEDURE — 3080F DIAST BP >= 90 MM HG: CPT | Performed by: INTERNAL MEDICINE

## 2024-09-06 PROCEDURE — 3077F SYST BP >= 140 MM HG: CPT | Performed by: INTERNAL MEDICINE

## 2024-09-06 PROCEDURE — 99215 OFFICE O/P EST HI 40 MIN: CPT | Performed by: INTERNAL MEDICINE

## 2024-09-08 NOTE — PROGRESS NOTES
REASON FOR VISIT: Pancreatic cyst,    HPI:  Celia Gomez is a 63 y.o. female who presents for Pancreatic cyst follow-up after procedure.    She was referred to me by PCP for abnormal MRI MRCP done on August 3, 2024 showed: Similar appearance 2.7 x 1.2 septated cyst within the pancreatic head without high risk features.  Measurably similar splenic artery aneurysm.  Enhancing lumen measure 1.6 x 1.6 cm.  Similarly probably benign liver cyst and kidney cyst.  Underwent endoscopic ultrasound with FNA on August 20/2024 showed 1 overall homogenous anechoic and unilocular cyst measuring 20 x 15 mm at the level of pancreatic head status post FNA.  This cyst was not connected to the main pancreatic duct.  .  3 of the wall of homogeneous anechoic unilocular cyst measuring 20 mm x 15 mm in the liver.  The parenchyma was visualized in the head of the pancreas body of the pancreas on the tail of the pancreas.  The parenchyma was homogeneous and hyperechoic.  There pancreatic duct appeared normal.  The common bile duct appeared normal.  The left kidney spleen and left other gland appeared normal.  Patient does not have any complaints.  Has good appetite, no weight loss, review of system negative.  She does not smoke drink occasionally not using any drugs.  No personal family history of GI cancer or pancreatic disease.  Had recent labs reviewed.  Last colonoscopy was done in September 2022 showed diverticulosis.  Otherwise normal colon advised to have a follow-up colonoscopy in 5 years        REVIEW OF SYSTEMS  Review of Systems   Constitutional: Negative.  Negative for activity change, appetite change, chills, fatigue, fever and unexpected weight change.   HENT: Negative.     Respiratory: Negative.     Cardiovascular: Negative.  Negative for chest pain and palpitations.   Gastrointestinal: Negative.  Negative for abdominal distention, abdominal pain, anal bleeding, blood in stool, constipation, diarrhea, nausea, rectal pain  and vomiting.   Skin: Negative.  Negative for color change and rash.   Neurological: Negative.  Negative for dizziness, tremors, seizures, weakness and headaches.   Psychiatric/Behavioral: Negative.  Negative for confusion.       Allergies   Allergen Reactions    Citrus And Derivatives Other and GI Upset     Migranes with citrus    Fruit Flavor Other    Mold Other       Past Medical History:   Diagnosis Date    Allergy to other foods 10/20/2016    History of food allergy    Asthma (HHS-HCC)     Chondrocostal junction syndrome (tietze)     Costochondritis    Disease of jaws, unspecified     Disorder of jaw    Encounter for general adult medical examination without abnormal findings 09/10/2019    Encounter for preventive health examination    Encounter for screening mammogram for malignant neoplasm of breast     Visit for screening mammogram    Environmental allergies     HTN (hypertension)     Other conditions influencing health status 10/20/2016    History of cough    Other conditions influencing health status 11/21/2020    History of cough    Other conditions influencing health status 12/26/2019    History of cough    Personal history of colonic polyps     History of adenomatous polyp of colon    Personal history of other diseases of the circulatory system 11/09/2022    History of abnormal electrocardiography    Personal history of other diseases of the circulatory system 06/22/2022    History of hypertension    Personal history of other diseases of the digestive system     History of esophageal reflux    Personal history of other diseases of the musculoskeletal system and connective tissue     History of fibromyalgia    Personal history of other diseases of the respiratory system     History of upper respiratory infection    Personal history of other specified conditions 11/17/2022    History of chest pain    Polyp of colon 09/09/2020    Serrated polyp of colon    Sleep apnea        Past Surgical History:    Procedure Laterality Date    CT ABDOMEN PELVIS ANGIOGRAM W AND/OR WO IV CONTRAST  2023    CT ABDOMEN PELVIS ANGIOGRAM W AND/OR WO IV CONTRAST 2023 [facility]    INCISIONAL BREAST BIOPSY  10/10/2017    Incisional Breast Biopsy    OTHER SURGICAL HISTORY  2012    Electrocardiogram    OTHER SURGICAL HISTORY  10/26/2022    Endoscopy    OTHER SURGICAL HISTORY  10/26/2022    Colonoscopy    OTHER SURGICAL HISTORY  2022    Wrist surgery    OTHER SURGICAL HISTORY  10/10/2017    Breast Surgery Puncture Aspiration Of Cyst       Family History   Problem Relation Name Age of Onset    Breast cancer Maternal Grandmother  60       Social History     Tobacco Use    Smoking status: Former     Current packs/day: 0.00     Types: Cigarettes     Quit date:      Years since quittin.6    Smokeless tobacco: Never   Substance Use Topics    Alcohol use: Not Currently       Current Outpatient Medications   Medication Sig Dispense Refill    cholecalciferol (Vitamin D-3) 25 MCG (1000 UT) tablet Take 1 tablet (1,000 Units) by mouth once daily.      cyanocobalamin (Vitamin B-12) 500 mcg tablet Take 1 tablet (500 mcg) by mouth once daily.      dicyclomine (Bentyl) 20 mg tablet Take by mouth.      DULoxetine (Cymbalta) 60 mg DR capsule Take 1 capsule (60 mg) by mouth once daily. 90 capsule 1    fluticasone (Flonase) 50 mcg/actuation nasal spray Administer into affected nostril(s) once daily.      hydroCHLOROthiazide (HYDRODiuril) 25 mg tablet Take 1 tablet (25 mg) by mouth once daily. 90 tablet 1    ibuprofen 200 mg tablet Take by mouth.      magnesium oxide 400 mg magnesium capsule Take 1 capsule (400 mg) by mouth once daily.      multivit-min/ferrous fumarate (MULTI VITAMIN ORAL) Take 1 tablet by mouth once daily.      multivitamin (Multiple Vitamins) tablet Take by mouth once daily.      pantoprazole (ProtoNix) 40 mg EC tablet Take 1 tablet (40 mg) by mouth once daily. Do not crush, chew, or split. 90 tablet 1     "rosuvastatin (Crestor) 5 mg tablet Take 1 tablet (5 mg) by mouth once daily. 90 tablet 1    TURMERIC ORAL Take 1 tablet by mouth once daily.      diclofenac sodium 3 % gel every 12 hours.       No current facility-administered medications for this visit.       PHYSICAL EXAM:  /90   Pulse 85   Ht 1.676 m (5' 6\")   Wt 84.4 kg (186 lb)   LMP  (LMP Unknown)   SpO2 95%   BMI 30.02 kg/m²    General appearance: No acute distress, cooperative.  Eyes: Nonicteric, no redness or proptosis  Ears, nose, mouth, and throat: Moist mucous membranes, tongue normal  Neck: Supple, no lymphadenopathy or thyromegaly  Lungs: Clear to auscultation bilaterally  CV: Regular rate and rhythm, no murmur; no pitting edema in the lower extremities  Abd: soft, non-tender; non-distended; normal active bowel sounds; no  scars  Skin: No rashes or lesions; no liver stigmata  MSK: No deformities or joint edema/redness/tenderness  Neuro: Alert and oriented ×3, normal gait, non-focal no asterixis    Lab Results   Component Value Date    WBC 6.6 01/05/2024    HGB 13.8 01/05/2024    HCT 41.3 01/05/2024    MCV 89 01/05/2024     01/05/2024       Lab Results   Component Value Date    ALT 30 01/05/2024    AST 27 01/05/2024    ALKPHOS 61 01/05/2024    BILITOT 0.6 01/05/2024     No results found for: \"INR\", \"PROTIME\"    Lab Results   Component Value Date    IRON 37 10/13/2021          ASSESSMENT&Plan:  Pancreatic cyst, 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  Patient does not have any alarming symptoms.  Advised to have a follow-up MRI MRCP in 1 year  Avoid smoking, drinking or using any drugs    History of colon polyps  Due for colonoscopy in end of 2027        Consultation requested by Dr. Carrie Torres MD.   My final recommendations will be communicated back to the requesting physician by way of shared Medical record or letter to requesting physician via fax.          Signature: Jeff Khan MD    Date: " 9/8/2024  Time: 10:32 AM

## 2024-09-16 ENCOUNTER — APPOINTMENT (OUTPATIENT)
Dept: PRIMARY CARE | Facility: CLINIC | Age: 64
End: 2024-09-16
Payer: COMMERCIAL

## 2024-09-16 VITALS
BODY MASS INDEX: 30.12 KG/M2 | HEIGHT: 66 IN | SYSTOLIC BLOOD PRESSURE: 125 MMHG | DIASTOLIC BLOOD PRESSURE: 84 MMHG | WEIGHT: 187.4 LBS | HEART RATE: 77 BPM

## 2024-09-16 DIAGNOSIS — I10 PRIMARY HYPERTENSION: ICD-10-CM

## 2024-09-16 DIAGNOSIS — M79.7 FIBROMYALGIA: ICD-10-CM

## 2024-09-16 DIAGNOSIS — M79.672 PAIN IN BOTH FEET: ICD-10-CM

## 2024-09-16 DIAGNOSIS — Z23 NEED FOR INFLUENZA VACCINATION: ICD-10-CM

## 2024-09-16 DIAGNOSIS — K86.2 CYST OF PANCREAS (HHS-HCC): ICD-10-CM

## 2024-09-16 DIAGNOSIS — K21.9 GASTROESOPHAGEAL REFLUX DISEASE WITHOUT ESOPHAGITIS: ICD-10-CM

## 2024-09-16 DIAGNOSIS — R76.8 ANA POSITIVE: Primary | ICD-10-CM

## 2024-09-16 DIAGNOSIS — M79.671 PAIN IN BOTH FEET: ICD-10-CM

## 2024-09-16 PROCEDURE — 1036F TOBACCO NON-USER: CPT | Performed by: INTERNAL MEDICINE

## 2024-09-16 PROCEDURE — 90471 IMMUNIZATION ADMIN: CPT | Performed by: INTERNAL MEDICINE

## 2024-09-16 PROCEDURE — 99214 OFFICE O/P EST MOD 30 MIN: CPT | Performed by: INTERNAL MEDICINE

## 2024-09-16 PROCEDURE — 3079F DIAST BP 80-89 MM HG: CPT | Performed by: INTERNAL MEDICINE

## 2024-09-16 PROCEDURE — 3074F SYST BP LT 130 MM HG: CPT | Performed by: INTERNAL MEDICINE

## 2024-09-16 PROCEDURE — 90656 IIV3 VACC NO PRSV 0.5 ML IM: CPT | Performed by: INTERNAL MEDICINE

## 2024-09-16 PROCEDURE — 3008F BODY MASS INDEX DOCD: CPT | Performed by: INTERNAL MEDICINE

## 2024-09-16 RX ORDER — DULOXETIN HYDROCHLORIDE 60 MG/1
60 CAPSULE, DELAYED RELEASE ORAL DAILY
Qty: 90 CAPSULE | Refills: 1 | Status: SHIPPED | OUTPATIENT
Start: 2024-09-16

## 2024-09-16 RX ORDER — PANTOPRAZOLE SODIUM 40 MG/1
40 TABLET, DELAYED RELEASE ORAL DAILY
Qty: 90 TABLET | Refills: 1 | Status: SHIPPED | OUTPATIENT
Start: 2024-09-16 | End: 2025-09-16

## 2024-09-16 NOTE — PROGRESS NOTES
Assessment/Plan   Problem List Items Addressed This Visit       Gastroesophageal reflux disease    Relevant Medications    pantoprazole (ProtoNix) 40 mg EC tablet    Fibromyalgia    Relevant Medications    DULoxetine (Cymbalta) 60 mg DR capsule    Hypertension    XU positive - Primary    Cyst of pancreas (HHS-HCC)    Need for influenza vaccination    Relevant Orders    Flu vaccine, trivalent, preservative free, age 6 months and greater (Fluraix/Fluzone/Flulaval) (Completed)    Pain in both feet    Relevant Orders    Referral to Podiatry     GERD is stable continue current treatment  Fibromyalgia better continue current treatment  Pain in the feet cause uncertain podiatry consult  Follow-up in 4 to 5 months time  Subjective   Patient ID: Celia Gomez is a 63 y.o. female who presents for Follow-up (Would like to discuss taking duloxetine still.  ).    Past Surgical History:   Procedure Laterality Date    CT ABDOMEN PELVIS ANGIOGRAM W AND/OR WO IV CONTRAST  2023    CT ABDOMEN PELVIS ANGIOGRAM W AND/OR WO IV CONTRAST 2023 [facility]    INCISIONAL BREAST BIOPSY  10/10/2017    Incisional Breast Biopsy    OTHER SURGICAL HISTORY  2012    Electrocardiogram    OTHER SURGICAL HISTORY  10/26/2022    Endoscopy    OTHER SURGICAL HISTORY  10/26/2022    Colonoscopy    OTHER SURGICAL HISTORY  2022    Wrist surgery    OTHER SURGICAL HISTORY  10/10/2017    Breast Surgery Puncture Aspiration Of Cyst      Family History   Problem Relation Name Age of Onset    Breast cancer Maternal Grandmother  60      Social History     Socioeconomic History    Marital status:      Spouse name: Not on file    Number of children: Not on file    Years of education: Not on file    Highest education level: Not on file   Occupational History    Not on file   Tobacco Use    Smoking status: Former     Current packs/day: 0.00     Types: Cigarettes     Quit date:      Years since quittin.7    Smokeless tobacco: Never    Substance and Sexual Activity    Alcohol use: Not Currently    Drug use: Never    Sexual activity: Not on file   Other Topics Concern    Not on file   Social History Narrative    Not on file     Social Determinants of Health     Financial Resource Strain: Not on file   Food Insecurity: No Food Insecurity (1/22/2024)    Hunger Vital Sign     Worried About Running Out of Food in the Last Year: Never true     Ran Out of Food in the Last Year: Never true   Transportation Needs: Not on file   Physical Activity: Not on file   Stress: Not on file   Social Connections: Not on file   Intimate Partner Violence: Not on file   Housing Stability: Not on file      Citrus and derivatives, Fruit flavor, and Mold   Current Outpatient Medications   Medication Sig Dispense Refill    cholecalciferol (Vitamin D-3) 25 MCG (1000 UT) tablet Take 1 tablet (1,000 Units) by mouth once daily.      cyanocobalamin (Vitamin B-12) 500 mcg tablet Take 1 tablet (500 mcg) by mouth once daily.      fluticasone (Flonase) 50 mcg/actuation nasal spray Administer into affected nostril(s) once daily.      hydroCHLOROthiazide (HYDRODiuril) 25 mg tablet Take 1 tablet (25 mg) by mouth once daily. 90 tablet 1    ibuprofen 200 mg tablet Take by mouth.      magnesium oxide 400 mg magnesium capsule Take 1 capsule (400 mg) by mouth once daily.      multivit-min/ferrous fumarate (MULTI VITAMIN ORAL) Take 1 tablet by mouth once daily.      rosuvastatin (Crestor) 5 mg tablet Take 1 tablet (5 mg) by mouth once daily. 90 tablet 1    TURMERIC ORAL Take 1 tablet by mouth once daily.      DULoxetine (Cymbalta) 60 mg DR capsule Take 1 capsule (60 mg) by mouth once daily. 90 capsule 1    pantoprazole (ProtoNix) 40 mg EC tablet Take 1 tablet (40 mg) by mouth once daily. Do not crush, chew, or split. 90 tablet 1     No current facility-administered medications for this visit.      Vitals:    09/16/24 1433   BP: 125/84   BP Location: Left arm   Patient Position: Sitting  "  Pulse: 77   Weight: 85 kg (187 lb 6.4 oz)   Height: 1.676 m (5' 6\")      Problem List Items Addressed This Visit       Gastroesophageal reflux disease    Relevant Medications    pantoprazole (ProtoNix) 40 mg EC tablet    Fibromyalgia    Relevant Medications    DULoxetine (Cymbalta) 60 mg DR capsule    Hypertension    XU positive - Primary    Cyst of pancreas (HHS-HCC)    Need for influenza vaccination    Relevant Orders    Flu vaccine, trivalent, preservative free, age 6 months and greater (Fluraix/Fluzone/Flulaval) (Completed)    Pain in both feet    Relevant Orders    Referral to Podiatry      Orders Placed This Encounter   Procedures    Flu vaccine, trivalent, preservative free, age 6 months and greater (Fluraix/Fluzone/Flulaval)    Referral to Podiatry     Standing Status:   Future     Standing Expiration Date:   9/16/2025     Referral Priority:   Routine     Referral Type:   Consultation     Referral Reason:   Specialty Services Required     Requested Specialty:   Podiatry     Number of Visits Requested:   1        HPI  For review and refill  I have reviewed the GI workup  Repeat MRCP in 1 year has been advised    ROS essentially negative except pain in both feet on the dorsum area in the middle  PPI is working  Duloxetine is working and fibromyalgia symptoms and wanted refill    Past medical history reviewed  Social and family history reviewed  Allergies and medications reviewed  Recent labs reviewed  Vital signs reviewed         PHYSICAL EXAM  Cardiovascular chest abdominal neurological examination normal  There is a bump on the dorsum of the foot      No results found for: \"PR1\", \"BMPR1A\", \"CMPLAS\", \"TD1VXCPB\", \"KPSAT\"   Lab Results   Component Value Date    CHOL 155 01/05/2024    LDLCALC 84 01/05/2024    CHHDL 3.3 01/05/2024                "

## 2024-09-19 ENCOUNTER — APPOINTMENT (OUTPATIENT)
Dept: CARDIOLOGY | Facility: CLINIC | Age: 64
End: 2024-09-19
Payer: COMMERCIAL

## 2024-09-19 VITALS
HEIGHT: 66 IN | SYSTOLIC BLOOD PRESSURE: 124 MMHG | HEART RATE: 86 BPM | BODY MASS INDEX: 30.22 KG/M2 | DIASTOLIC BLOOD PRESSURE: 80 MMHG | WEIGHT: 188 LBS

## 2024-09-19 DIAGNOSIS — R07.89 ATYPICAL CHEST PAIN: ICD-10-CM

## 2024-09-19 DIAGNOSIS — I10 PRIMARY HYPERTENSION: ICD-10-CM

## 2024-09-19 DIAGNOSIS — E78.5 DYSLIPIDEMIA: Primary | ICD-10-CM

## 2024-09-19 DIAGNOSIS — R93.1 AGATSTON CAC SCORE, <100: ICD-10-CM

## 2024-09-19 PROBLEM — H92.09 OTALGIA: Status: RESOLVED | Noted: 2023-11-21 | Resolved: 2024-09-19

## 2024-09-19 PROBLEM — R52 ACUTE PAIN: Status: RESOLVED | Noted: 2023-11-21 | Resolved: 2024-09-19

## 2024-09-19 PROBLEM — J06.9 URTI (ACUTE UPPER RESPIRATORY INFECTION): Status: RESOLVED | Noted: 2023-11-21 | Resolved: 2024-09-19

## 2024-09-19 PROBLEM — M27.9 DISORDER OF JAW: Status: RESOLVED | Noted: 2024-01-18 | Resolved: 2024-09-19

## 2024-09-19 PROBLEM — Z23 NEED FOR INFLUENZA VACCINATION: Status: RESOLVED | Noted: 2024-09-16 | Resolved: 2024-09-19

## 2024-09-19 PROBLEM — N39.0 URINARY TRACT INFECTION: Status: RESOLVED | Noted: 2024-01-18 | Resolved: 2024-09-19
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
## 2024-09-19 PROBLEM — Z20.822 EXPOSURE TO COVID-19 VIRUS: Status: RESOLVED | Noted: 2023-11-21 | Resolved: 2024-09-19

## 2024-09-19 PROBLEM — J40 BRONCHITIS: Status: RESOLVED | Noted: 2023-11-21 | Resolved: 2024-09-19

## 2024-09-19 PROBLEM — Z12.31 VISIT FOR SCREENING MAMMOGRAM: Status: RESOLVED | Noted: 2023-11-21 | Resolved: 2024-09-19

## 2024-09-19 PROBLEM — H92.09 EAR ACHE: Status: RESOLVED | Noted: 2023-11-21 | Resolved: 2024-09-19

## 2024-09-19 PROBLEM — R09.81 NASAL CONGESTION: Status: RESOLVED | Noted: 2023-11-21 | Resolved: 2024-09-19

## 2024-09-19 PROBLEM — S20.219A CONTUSION OF CHEST WALL: Status: RESOLVED | Noted: 2023-11-21 | Resolved: 2024-09-19

## 2024-09-19 PROBLEM — J02.9 SORE THROAT: Status: RESOLVED | Noted: 2023-11-21 | Resolved: 2024-09-19

## 2024-09-19 PROBLEM — S92.309A CLOSED FRACTURE OF METATARSAL BONE: Status: RESOLVED | Noted: 2022-05-10 | Resolved: 2024-09-19

## 2024-09-19 PROBLEM — S62.101A WRIST FRACTURE, RIGHT: Status: RESOLVED | Noted: 2023-11-21 | Resolved: 2024-09-19

## 2024-09-19 PROBLEM — S52.539A CLOSED COLLES' FRACTURE: Status: RESOLVED | Noted: 2022-05-10 | Resolved: 2024-09-19

## 2024-09-19 PROCEDURE — 3074F SYST BP LT 130 MM HG: CPT | Performed by: INTERNAL MEDICINE

## 2024-09-19 PROCEDURE — 93000 ELECTROCARDIOGRAM COMPLETE: CPT | Performed by: INTERNAL MEDICINE

## 2024-09-19 PROCEDURE — 1036F TOBACCO NON-USER: CPT | Performed by: INTERNAL MEDICINE

## 2024-09-19 PROCEDURE — 3079F DIAST BP 80-89 MM HG: CPT | Performed by: INTERNAL MEDICINE

## 2024-09-19 PROCEDURE — 99214 OFFICE O/P EST MOD 30 MIN: CPT | Performed by: INTERNAL MEDICINE

## 2024-09-19 PROCEDURE — 3008F BODY MASS INDEX DOCD: CPT | Performed by: INTERNAL MEDICINE

## 2024-09-19 RX ORDER — VITAMIN E MIXED 400 UNIT
200 CAPSULE ORAL DAILY
COMMUNITY

## 2024-09-19 ASSESSMENT — ENCOUNTER SYMPTOMS
ABDOMINAL DISTENTION: 1
MYALGIAS: 1
HEMATOLOGIC/LYMPHATIC NEGATIVE: 1
PALPITATIONS: 0
NEUROLOGICAL NEGATIVE: 1
DIARRHEA: 1
CONSTITUTIONAL NEGATIVE: 1
CONSTIPATION: 1
RESPIRATORY NEGATIVE: 1
PSYCHIATRIC NEGATIVE: 1
ARTHRALGIAS: 1

## 2024-09-19 NOTE — PROGRESS NOTES
Patient:  Celia Gomez  YOB: 1960  MRN: [id number]       Chief Complaint/Active Symptoms:       Celia Gomez is a 64 y.o. female who returns today for cardiac follow-up.    Patient returns after 2-year hiatus.  A CT cardiac score in November 2022 that was 0 but has a history of hypertension and hyperlipidemia.    Has occasional sensation of a sticking pain in her chest or tightness without any specific pattern. No shortness of breath, no syncope or near syncope, no orthopnea or PND. Has occasional mild ankle edema.  The patient is active and while she does not run she takes walks every day and has no problems doing any of her physical exercise.    Issues she has a related to multiple GI chronic issues as well as different arthritic myalgias and joint pain and foot pain.    Review of Systems   Constitutional: Negative.    HENT: Negative.     Respiratory: Negative.     Cardiovascular:  Positive for chest pain. Negative for palpitations and leg swelling.   Gastrointestinal:  Positive for abdominal distention, constipation and diarrhea.   Genitourinary: Negative.    Musculoskeletal:  Positive for arthralgias and myalgias.   Neurological: Negative.    Hematological: Negative.    Psychiatric/Behavioral: Negative.     All other systems reviewed and are negative.      Objective:     Vitals:    09/19/24 0838   BP: 124/80   Pulse: 86       Vitals:    09/19/24 0838   Weight: 85.3 kg (188 lb)       Allergies:     Allergies   Allergen Reactions    Citrus And Derivatives Other and GI Upset     Migranes with citrus    Fruit Flavor Other    Mold Other          Medications:     Current Outpatient Medications   Medication Instructions    cholecalciferol (VITAMIN D-3) 1,000 Units, oral, Daily    cyanocobalamin (Vitamin B-12) 500 mcg tablet 1 tablet, oral, Daily    DULoxetine (CYMBALTA) 60 mg, oral, Daily    fluticasone (Flonase) 50 mcg/actuation nasal spray nasal, Daily RT    hydroCHLOROthiazide  (HYDRODIURIL) 25 mg, oral, Daily    ibuprofen 200 mg tablet oral    magnesium oxide 400 mg magnesium capsule 1 capsule, oral, Daily    multivit-min/ferrous fumarate (MULTI VITAMIN ORAL) 1 tablet, oral, Daily    pantoprazole (PROTONIX) 40 mg, oral, Daily, Do not crush, chew, or split.    rosuvastatin (CRESTOR) 5 mg, oral, Daily    TURMERIC ORAL 1 tablet, oral, Daily    vitamin E 200 Units, oral, Daily       Physical Examination:   GENERAL:  Well developed, well nourished, in no acute distress.  HEENT: NC AT, EOMI with anicteric sclera  NECK:  Supple, no JVD, no bruit.  CHEST:  Symmetric and nontender.  LUNGS:  Clear to auscultation bilaterally, normal respiratory effort.  HEART:  PMI is nondisplaced. RRR with normal S1 and S2, no S3, no mumur or rub. No carotid or abdominal bruits  ABDOMEN: Soft, NT, ND without palpable organomegaly or bruits  EXTREMITIES:  Warm with good color, no clubbing or cyanosis.  There is no edema noted.  PERIPHERAL VASCULAR:  Pulses present and equally palpable; 2+ throughout.  MUSCULOSKELETAL: Minor early osteoarthritic changes  NEURO/PSYCH:  Alert and oriented times three with approppriate behavior and responses. Nonfocal motor examination with normal gait and ambulation  Lymph: No significant palpable lymphadenopathy  Skin: no rash or lesions on exposed skin or reported.    Lab:     CBC:   Lab Results   Component Value Date    WBC 6.6 01/05/2024    RBC 4.66 01/05/2024    HGB 13.8 01/05/2024    HCT 41.3 01/05/2024     01/05/2024        CMP:    Lab Results   Component Value Date     01/05/2024    K 3.5 01/05/2024     01/05/2024    CO2 29 01/05/2024    BUN 13 01/05/2024    CREATININE 0.77 01/05/2024    GLUCOSE 103 (H) 01/05/2024    CALCIUM 9.4 01/05/2024       Magnesium:    Lab Results   Component Value Date    MG 2.10 09/26/2022       Lipid Profile:    Lab Results   Component Value Date    TRIG 119 01/05/2024    HDL 46.9 01/05/2024    LDLCALC 84 01/05/2024       TSH:   "  Lab Results   Component Value Date    TSH 1.82 05/01/2023       BNP:   No results found for: \"BNP\"     PT/INR:    No results found for: \"PROTIME\", \"INR\"    HgBA1c:    Lab Results   Component Value Date    HGBA1C 5.3 05/18/2020       BMP:  Lab Results   Component Value Date     01/05/2024     05/01/2023     10/26/2022     03/27/2021    K 3.5 01/05/2024    K 4.0 05/01/2023    K 4.1 10/26/2022    K 4.4 03/27/2021     01/05/2024     05/01/2023     10/26/2022     03/27/2021    CO2 29 01/05/2024    CO2 28 05/01/2023    CO2 26 10/26/2022    CO2 25 03/27/2021    BUN 13 01/05/2024    BUN 14 05/01/2023    BUN 12 10/26/2022    BUN 13 09/26/2022    CREATININE 0.77 01/05/2024    CREATININE 0.81 05/01/2023    CREATININE 0.77 10/26/2022    CREATININE 0.80 09/26/2022       Cardiac Enzymes:    No results found for: \"TROPHS\"    Hepatic Function Panel:    Lab Results   Component Value Date    ALKPHOS 61 01/05/2024    ALT 30 01/05/2024    AST 27 01/05/2024    PROT 7.8 01/05/2024    BILITOT 0.6 01/05/2024         Diagnostic Studies:     No echocardiogram results found for the past 12 months  Echocardiogram in 2022 shows normal LV function      EKG:   EKG today shows normal sinus rhythm with delayed transition and minor nonspecific inferior T abnormality.  No significant change.    Radiology:     No orders to display   CT cardiac score in October 2022 was 0      ASSESSMENT     Problem List Items Addressed This Visit       Dyslipidemia - Primary    Relevant Orders    ECG 12 lead (Clinic Performed) (Completed)    Hypertension    Atypical chest pain    Agatston CAC score, <100       PLAN     1.  Atypical chest pain.  Her current symptoms do not sound like angina pectoris and have not changed would follow her clinically with risk factor modification.  2.  Hypertension.  Blood pressures are well-controlled on her current medical regimen.  3.  Dyslipidemia.  She is tolerating low-dose statin " therapy and her cholesterol is at goal continue diet exercise weight loss and a Mediterranean diet.  4.  Coronary artery calcium score less than 100.  Actually was 0 in October 2022 would repeat her CT cardiac score sometime in late [id number].    Irregular manage the patient can come and see me in 3 to 5 years for that CT cardiac score and repeat assessment unless she develops changes in her chest discomfort or any new abnormalities would be happy to see her sooner

## 2024-10-13 ENCOUNTER — PATIENT MESSAGE (OUTPATIENT)
Dept: CARDIOLOGY | Facility: CLINIC | Age: 64
End: 2024-10-13
Payer: COMMERCIAL

## 2024-10-13 DIAGNOSIS — I10 HTN (HYPERTENSION), BENIGN: ICD-10-CM

## 2024-10-13 DIAGNOSIS — E78.5 HYPERLIPIDEMIA, UNSPECIFIED HYPERLIPIDEMIA TYPE: ICD-10-CM

## 2024-10-15 RX ORDER — ROSUVASTATIN CALCIUM 5 MG/1
5 TABLET, COATED ORAL DAILY
Qty: 90 TABLET | Refills: 1 | Status: SHIPPED | OUTPATIENT
Start: 2024-10-15 | End: 2025-04-13

## 2024-10-15 RX ORDER — HYDROCHLOROTHIAZIDE 25 MG/1
25 TABLET ORAL DAILY
Qty: 90 TABLET | Refills: 1 | Status: SHIPPED | OUTPATIENT
Start: 2024-10-15 | End: 2025-04-13

## 2024-11-05 ENCOUNTER — HOSPITAL ENCOUNTER (OUTPATIENT)
Dept: RADIOLOGY | Facility: CLINIC | Age: 64
Discharge: HOME | End: 2024-11-05
Payer: COMMERCIAL

## 2024-11-05 ENCOUNTER — OFFICE VISIT (OUTPATIENT)
Dept: PODIATRY | Facility: CLINIC | Age: 64
End: 2024-11-05
Payer: COMMERCIAL

## 2024-11-05 DIAGNOSIS — M77.8 EXTENSOR TENDINITIS OF FOOT: ICD-10-CM

## 2024-11-05 DIAGNOSIS — M76.822 POSTERIOR TIBIAL TENDON DYSFUNCTION (PTTD) OF BOTH LOWER EXTREMITIES: Primary | ICD-10-CM

## 2024-11-05 DIAGNOSIS — M79.672 PAIN IN BOTH FEET: ICD-10-CM

## 2024-11-05 DIAGNOSIS — M79.671 PAIN IN BOTH FEET: ICD-10-CM

## 2024-11-05 DIAGNOSIS — M20.11 HAV (HALLUX ABDUCTO VALGUS), RIGHT: ICD-10-CM

## 2024-11-05 DIAGNOSIS — M76.821 POSTERIOR TIBIAL TENDON DYSFUNCTION (PTTD) OF BOTH LOWER EXTREMITIES: Primary | ICD-10-CM

## 2024-11-05 DIAGNOSIS — M20.12 HAV (HALLUX ABDUCTO VALGUS), LEFT: ICD-10-CM

## 2024-11-05 PROCEDURE — 1036F TOBACCO NON-USER: CPT | Performed by: PODIATRIST

## 2024-11-05 PROCEDURE — 99213 OFFICE O/P EST LOW 20 MIN: CPT | Performed by: PODIATRIST

## 2024-11-05 PROCEDURE — 73630 X-RAY EXAM OF FOOT: CPT | Mod: 50

## 2024-11-05 PROCEDURE — 99203 OFFICE O/P NEW LOW 30 MIN: CPT | Performed by: PODIATRIST

## 2024-11-05 PROCEDURE — 73630 X-RAY EXAM OF FOOT: CPT | Mod: BILATERAL PROCEDURE | Performed by: STUDENT IN AN ORGANIZED HEALTH CARE EDUCATION/TRAINING PROGRAM

## 2024-11-05 RX ORDER — METHYLPREDNISOLONE 4 MG/1
TABLET ORAL
Qty: 21 TABLET | Refills: 0 | Status: SHIPPED | OUTPATIENT
Start: 2024-11-05 | End: 2024-11-12

## 2024-11-05 NOTE — PROGRESS NOTES
Chief Complaint   Patient presents with    Foot Pain     Pt is here for bilateral foot pain. Left foot is worse at this time.     HPI:C/O L>R foot pain.  Dorsal foot pain.  Pain radiates up dorsal foot proximally.  At times Achilles pain.  Also bunion pain.  Went to [employer] [facility] wearing inserts and shoes x 1.5 months, helps the pain.  Ibuprofen 400mg x twice a day, does have GERD on medication.  Tried tylenol too.  C/O post-static dyskinesia.    PMH, PSx, Medications and allergies reviewed.  ROS negative except for what is stated in HPI.    Physical Exam  Patient alert, oriented, no acute distress  Respiratory: non labored breathing  Psychiatric: Mood and affect normal/baseline  HEENT: sclera clear    VASC: +2/4 DP pulses B/L.  Unable to palpate PT pulses secondary to edema.  Edema is mainly concentrated around the medial ankles bilaterally.  CFT brisk all digits.  Skin temperature is warm to warm proximal distal B/L.  (+)hair growth B/L.   Mild LE edema B/L and multiple varicosities.     NEURO: Vibratory intact B/L.  Light touch intact B/L.   5.07 [place] monofilament intact B/L.    DERM: No erythema, no ecchymosis, no ulcerations noted B/L.  Nails are painted.    MUSCULOSKEL: +5/5 muscle strength B/L.    HAV B/L noted.   Decreased 1st MPJ and ankle joint ROM B/L.  Hypermobile flatfoot B/L.  Calcaneal inversion noted with double heel lifts bilaterally.    No calcaneal inversion noted with a single heel lift left this is painful  Pain with resisted dorsiflexion and inversion of the foot L>R  Pain over 1st MPJ L>R and with ROM  No medial eminence pain on palpation bilaterally  No pain over Achilles tendons bilaterally    Assessment and Plan  #1 PTTD  Reviewed pathology and treatment options  Continue in supportive inserts and shoes  ice 20 minutes on, 20 minutes off  Exercises printed off  Patient also instructed on nonweightbearing range of motion exercises  Rx: Medrol Dosepak  Rx: X-rays bilateral  foot  Follow-up 3 weeks    #2 HAV B/L  Treatment as above    #3  Extensor tendinitis left foot  This is likely secondary to the PTTD  Treatment as above

## 2024-11-09 ENCOUNTER — HOSPITAL ENCOUNTER (OUTPATIENT)
Dept: RADIOLOGY | Facility: HOSPITAL | Age: 64
Discharge: HOME | End: 2024-11-09
Payer: COMMERCIAL

## 2024-11-09 DIAGNOSIS — Z12.31 VISIT FOR SCREENING MAMMOGRAM: ICD-10-CM

## 2024-11-09 PROCEDURE — 77067 SCR MAMMO BI INCL CAD: CPT | Performed by: STUDENT IN AN ORGANIZED HEALTH CARE EDUCATION/TRAINING PROGRAM

## 2024-11-09 PROCEDURE — 77063 BREAST TOMOSYNTHESIS BI: CPT | Performed by: STUDENT IN AN ORGANIZED HEALTH CARE EDUCATION/TRAINING PROGRAM

## 2024-11-09 PROCEDURE — 77067 SCR MAMMO BI INCL CAD: CPT

## 2024-12-03 ENCOUNTER — APPOINTMENT (OUTPATIENT)
Dept: PODIATRY | Facility: CLINIC | Age: 64
End: 2024-12-03
Payer: COMMERCIAL

## 2025-01-16 DIAGNOSIS — I10 HTN (HYPERTENSION), BENIGN: ICD-10-CM

## 2025-01-16 DIAGNOSIS — E78.5 HYPERLIPIDEMIA, UNSPECIFIED HYPERLIPIDEMIA TYPE: ICD-10-CM

## 2025-01-20 RX ORDER — ROSUVASTATIN CALCIUM 5 MG/1
5 TABLET, COATED ORAL DAILY
Qty: 90 TABLET | Refills: 0 | Status: SHIPPED | OUTPATIENT
Start: 2025-01-20 | End: 2025-04-20

## 2025-01-20 RX ORDER — HYDROCHLOROTHIAZIDE 25 MG/1
25 TABLET ORAL DAILY
Qty: 90 TABLET | Refills: 0 | Status: SHIPPED | OUTPATIENT
Start: 2025-01-20 | End: 2025-04-20

## 2025-02-03 ENCOUNTER — APPOINTMENT (OUTPATIENT)
Dept: PRIMARY CARE | Facility: CLINIC | Age: 65
End: 2025-02-03
Payer: COMMERCIAL

## 2025-04-02 ENCOUNTER — OFFICE VISIT (OUTPATIENT)
Dept: PRIMARY CARE | Facility: CLINIC | Age: 65
End: 2025-04-02
Payer: COMMERCIAL

## 2025-04-02 VITALS
DIASTOLIC BLOOD PRESSURE: 83 MMHG | OXYGEN SATURATION: 99 % | WEIGHT: 193 LBS | SYSTOLIC BLOOD PRESSURE: 131 MMHG | TEMPERATURE: 96.4 F | HEART RATE: 89 BPM | BODY MASS INDEX: 31.15 KG/M2

## 2025-04-02 DIAGNOSIS — K86.2 CYST OF PANCREAS (HHS-HCC): ICD-10-CM

## 2025-04-02 DIAGNOSIS — Z00.00 WELL ADULT HEALTH CHECK: ICD-10-CM

## 2025-04-02 DIAGNOSIS — I10 HTN (HYPERTENSION), BENIGN: ICD-10-CM

## 2025-04-02 DIAGNOSIS — E78.5 DYSLIPIDEMIA: ICD-10-CM

## 2025-04-02 DIAGNOSIS — I10 PRIMARY HYPERTENSION: ICD-10-CM

## 2025-04-02 DIAGNOSIS — M79.7 FIBROMYALGIA: Primary | ICD-10-CM

## 2025-04-02 DIAGNOSIS — I72.8 ANEURYSM OF SPLENIC ARTERY (CMS-HCC): ICD-10-CM

## 2025-04-02 DIAGNOSIS — E78.5 HYPERLIPIDEMIA, UNSPECIFIED HYPERLIPIDEMIA TYPE: ICD-10-CM

## 2025-04-02 PROCEDURE — 1036F TOBACCO NON-USER: CPT | Performed by: INTERNAL MEDICINE

## 2025-04-02 PROCEDURE — 3079F DIAST BP 80-89 MM HG: CPT | Performed by: INTERNAL MEDICINE

## 2025-04-02 PROCEDURE — 3075F SYST BP GE 130 - 139MM HG: CPT | Performed by: INTERNAL MEDICINE

## 2025-04-02 PROCEDURE — 99214 OFFICE O/P EST MOD 30 MIN: CPT | Performed by: INTERNAL MEDICINE

## 2025-04-02 RX ORDER — ROSUVASTATIN CALCIUM 5 MG/1
5 TABLET, COATED ORAL DAILY
Qty: 90 TABLET | Refills: 1 | Status: SHIPPED | OUTPATIENT
Start: 2025-04-02 | End: 2025-09-29

## 2025-04-02 RX ORDER — HYDROCHLOROTHIAZIDE 25 MG/1
25 TABLET ORAL DAILY
Qty: 90 TABLET | Refills: 1 | Status: SHIPPED | OUTPATIENT
Start: 2025-04-02 | End: 2025-09-29

## 2025-04-02 RX ORDER — DULOXETIN HYDROCHLORIDE 60 MG/1
60 CAPSULE, DELAYED RELEASE ORAL DAILY
Qty: 90 CAPSULE | Refills: 1 | Status: SHIPPED | OUTPATIENT
Start: 2025-04-02

## 2025-04-02 ASSESSMENT — PATIENT HEALTH QUESTIONNAIRE - PHQ9
1. LITTLE INTEREST OR PLEASURE IN DOING THINGS: NOT AT ALL
SUM OF ALL RESPONSES TO PHQ9 QUESTIONS 1 AND 2: 0
2. FEELING DOWN, DEPRESSED OR HOPELESS: NOT AT ALL

## 2025-04-02 NOTE — PROGRESS NOTES
Assessment/Plan   Problem List Items Addressed This Visit       Dyslipidemia    Relevant Orders    Lipid Panel    Fibromyalgia - Primary    Relevant Medications    DULoxetine (Cymbalta) 60 mg DR capsule    Other Relevant Orders    TSH with reflex to Free T4 if abnormal    Sedimentation Rate    CBC    Hypertension    Relevant Medications    hydroCHLOROthiazide (HYDRODiuril) 25 mg tablet    Cyst of pancreas (HHS-HCC)    Relevant Orders    Comprehensive Metabolic Panel    CBC    Aneurysm of splenic artery (CMS-HCC)     Other Visit Diagnoses       Well adult health check        Relevant Orders    Comprehensive Metabolic Panel    CBC    HTN (hypertension), benign        Relevant Medications    hydroCHLOROthiazide (HYDRODiuril) 25 mg tablet    Hyperlipidemia, unspecified hyperlipidemia type        Relevant Medications    rosuvastatin (Crestor) 5 mg tablet          All these conditions discussed side effect of medication discussed  Refill provided  If the labs are okay follow-up in 3 months unless otherwise indicated  Subjective   Patient ID: Celia Gomez is a 64 y.o. female who presents for Annual Exam, Hypertension, and GERD.    Past Surgical History:   Procedure Laterality Date    CT ABDOMEN PELVIS ANGIOGRAM W AND/OR WO IV CONTRAST  05/04/2023    CT ABDOMEN PELVIS ANGIOGRAM W AND/OR WO IV CONTRAST 5/4/2023 [facility]    INCISIONAL BREAST BIOPSY  10/10/2017    Incisional Breast Biopsy    OTHER SURGICAL HISTORY  12/17/2012    Electrocardiogram    OTHER SURGICAL HISTORY  10/26/2022    Endoscopy    OTHER SURGICAL HISTORY  10/26/2022    Colonoscopy    OTHER SURGICAL HISTORY  05/23/2022    Wrist surgery    OTHER SURGICAL HISTORY  10/10/2017    Breast Surgery Puncture Aspiration Of Cyst      Family History   Problem Relation Name Age of Onset    Breast cancer Maternal Grandmother  60      Social History     Socioeconomic History    Marital status:      Spouse name: Not on file    Number of children: Not on file     Years of education: Not on file    Highest education level: Not on file   Occupational History    Not on file   Tobacco Use    Smoking status: Former     Current packs/day: 0.00     Average packs/day: 0.3 packs/day for 35.0 years (8.8 ttl pk-yrs)     Types: Cigarettes     Start date: 1980     Quit date: 2015     Years since quitting: 10.2    Smokeless tobacco: Never   Vaping Use    Vaping status: Never Used   Substance and Sexual Activity    Alcohol use: Yes     Comment: occ wine    Drug use: Never    Sexual activity: Not on file   Other Topics Concern    Not on file   Social History Narrative    Not on file     Social Drivers of Health     Financial Resource Strain: Not on file   Food Insecurity: No Food Insecurity (1/22/2024)    Hunger Vital Sign     Worried About Running Out of Food in the Last Year: Never true     Ran Out of Food in the Last Year: Never true   Transportation Needs: Not on file   Physical Activity: Not on file   Stress: Not on file   Social Connections: Not on file   Intimate Partner Violence: Not on file   Housing Stability: Not on file      Citrus and derivatives, Fruit flavor, and Mold   Current Outpatient Medications   Medication Sig Dispense Refill    cholecalciferol (Vitamin D-3) 25 MCG (1000 UT) tablet Take 1 tablet (1,000 Units) by mouth once daily.      cyanocobalamin (Vitamin B-12) 500 mcg tablet Take 1 tablet (500 mcg) by mouth once daily.      ibuprofen 200 mg tablet Take by mouth.      magnesium oxide 400 mg magnesium capsule Take 1 capsule (400 mg) by mouth once daily.      multivit-min/ferrous fumarate (MULTI VITAMIN ORAL) Take 1 tablet by mouth once daily.      pantoprazole (ProtoNix) 40 mg EC tablet Take 1 tablet (40 mg) by mouth once daily. Do not crush, chew, or split. 90 tablet 1    TURMERIC ORAL Take 1 tablet by mouth once daily.      vitamin E 90 mg (200 unit) capsule Take 1 capsule (200 Units) by mouth once daily.      DULoxetine (Cymbalta) 60 mg DR capsule Take 1 capsule  (60 mg) by mouth once daily. 90 capsule 1    hydroCHLOROthiazide (HYDRODiuril) 25 mg tablet Take 1 tablet (25 mg) by mouth once daily. 90 tablet 1    rosuvastatin (Crestor) 5 mg tablet Take 1 tablet (5 mg) by mouth once daily. 90 tablet 1     No current facility-administered medications for this visit.      Vitals:    04/02/25 1010   BP: 131/83   BP Location: Left arm   Patient Position: Sitting   Pulse: 89   Temp: 35.8 °C (96.4 °F)   TempSrc: Skin   SpO2: 99%   Weight: 87.5 kg (193 lb)      Problem List Items Addressed This Visit       Dyslipidemia    Relevant Orders    Lipid Panel    Fibromyalgia - Primary    Relevant Medications    DULoxetine (Cymbalta) 60 mg DR capsule    Other Relevant Orders    TSH with reflex to Free T4 if abnormal    Sedimentation Rate    CBC    Hypertension    Relevant Medications    hydroCHLOROthiazide (HYDRODiuril) 25 mg tablet    Cyst of pancreas (HHS-HCC)    Relevant Orders    Comprehensive Metabolic Panel    CBC    Aneurysm of splenic artery (CMS-HCC)     Other Visit Diagnoses       Well adult health check        Relevant Orders    Comprehensive Metabolic Panel    CBC    HTN (hypertension), benign        Relevant Medications    hydroCHLOROthiazide (HYDRODiuril) 25 mg tablet    Hyperlipidemia, unspecified hyperlipidemia type        Relevant Medications    rosuvastatin (Crestor) 5 mg tablet           Orders Placed This Encounter   Procedures    Comprehensive Metabolic Panel     Standing Status:   Future     Number of Occurrences:   1     Standing Expiration Date:   4/2/2026     Order Specific Question:   Release result to MyChart     Answer:   Immediate    Lipid Panel     Standing Status:   Future     Number of Occurrences:   1     Standing Expiration Date:   4/2/2026     Order Specific Question:   Release result to MyChart     Answer:   Immediate    TSH with reflex to Free T4 if abnormal     Standing Status:   Future     Number of Occurrences:   1     Standing Expiration Date:    "4/2/2026     Order Specific Question:   Release result to MyChart     Answer:   Immediate    Sedimentation Rate     Standing Status:   Future     Number of Occurrences:   1     Standing Expiration Date:   4/2/2026     Order Specific Question:   Release result to MyChart     Answer:   Immediate [1]    CBC     Standing Status:   Future     Number of Occurrences:   1     Standing Expiration Date:   4/2/2026     Order Specific Question:   Release result to MyChart     Answer:   Immediate        HPI  Came for review and refill  Fibromyalgia acting up  Using sometimes ibuprofen  Apparently she is planning to have a trip to [place]  She has not had blood work done  Her blood pressure at home was little elevated but here it is normal  Has recent eye exam which was fine    ROS other systemic inquiry negative  Past medical history reviewed  Social and family history reviewed  Allergies and medications reviewed  Recent labs reviewed  Vital signs reviewed    PHYSICAL EXAM  Cardiovascular chest abdominal neurological examination normal  Previous labs reviewed  Further labs ordered      No results found for: \"PR1\", \"BMPR1A\", \"CMPLAS\", \"IP9KQCKF\", \"KPSAT\"   Lab Results   Component Value Date    CHOL 155 01/05/2024    LDLCALC 84 01/05/2024    CHHDL 3.3 01/05/2024     Lab Results   Component Value Date    TSH 1.82 05/01/2023    HGBA1C 5.3 05/18/2020              === 05/04/23 ===    CT ABDOMEN PELVIS ANGIOGRAM W AND/OR WO IV CONTRAST    - Impression -  1. Similar appearance and size of peripherally calcified splenic  aneurysms as described above.  2. No other vascular abnormality visualized. No evidence of aortic  aneurysm or dissection.  3. Hypoattenuating lesion at the head of the pancreas, without  apparent communication with the main pancreatic duct. Recommend MRCP  for further evaluation.  4. Additional findings as above.    I personally reviewed the images/study and I agree with the findings  as stated. This study was " interpreted at [facility], [place], [place].    Results for orders placed in visit on 11/23/22    ECHOCARDIOGRAM    Narrative  Ordered by an unspecified provider.

## 2025-04-05 LAB
ALBUMIN SERPL-MCNC: 4.6 G/DL (ref 3.6–5.1)
ALP SERPL-CCNC: 59 U/L (ref 37–153)
ALT SERPL-CCNC: 20 U/L (ref 6–29)
ANION GAP SERPL CALCULATED.4IONS-SCNC: 10 MMOL/L (CALC) (ref 7–17)
AST SERPL-CCNC: 22 U/L (ref 10–35)
BILIRUB SERPL-MCNC: 0.4 MG/DL (ref 0.2–1.2)
BUN SERPL-MCNC: 16 MG/DL (ref 7–25)
CALCIUM SERPL-MCNC: 9.5 MG/DL (ref 8.6–10.4)
CHLORIDE SERPL-SCNC: 101 MMOL/L (ref 98–110)
CHOLEST SERPL-MCNC: 148 MG/DL
CHOLEST/HDLC SERPL: 2.7 (CALC)
CO2 SERPL-SCNC: 27 MMOL/L (ref 20–32)
CREAT SERPL-MCNC: 0.76 MG/DL (ref 0.5–1.05)
EGFRCR SERPLBLD CKD-EPI 2021: 88 ML/MIN/1.73M2
ERYTHROCYTE [DISTWIDTH] IN BLOOD BY AUTOMATED COUNT: 13.3 % (ref 11–15)
ERYTHROCYTE [SEDIMENTATION RATE] IN BLOOD BY WESTERGREN METHOD: 9 MM/H
GLUCOSE SERPL-MCNC: 119 MG/DL (ref 65–99)
HCT VFR BLD AUTO: 40 % (ref 35–45)
HDLC SERPL-MCNC: 55 MG/DL
HGB BLD-MCNC: 12.5 G/DL (ref 11.7–15.5)
LDLC SERPL CALC-MCNC: 77 MG/DL (CALC)
MCH RBC QN AUTO: 26.4 PG (ref 27–33)
MCHC RBC AUTO-ENTMCNC: 31.3 G/DL (ref 32–36)
MCV RBC AUTO: 84.6 FL (ref 80–100)
NONHDLC SERPL-MCNC: 93 MG/DL (CALC)
PLATELET # BLD AUTO: 365 THOUSAND/UL (ref 140–400)
PMV BLD REES-ECKER: 9 FL (ref 7.5–12.5)
POTASSIUM SERPL-SCNC: 4.1 MMOL/L (ref 3.5–5.3)
PROT SERPL-MCNC: 7.4 G/DL (ref 6.1–8.1)
RBC # BLD AUTO: 4.73 MILLION/UL (ref 3.8–5.1)
SODIUM SERPL-SCNC: 138 MMOL/L (ref 135–146)
TRIGL SERPL-MCNC: 79 MG/DL
TSH SERPL-ACNC: 1.87 MIU/L (ref 0.4–4.5)
WBC # BLD AUTO: 5.5 THOUSAND/UL (ref 3.8–10.8)

## 2025-04-11 ENCOUNTER — OFFICE VISIT (OUTPATIENT)
Dept: PRIMARY CARE | Facility: CLINIC | Age: 65
End: 2025-04-11
Payer: COMMERCIAL

## 2025-04-11 VITALS
HEART RATE: 78 BPM | WEIGHT: 192 LBS | DIASTOLIC BLOOD PRESSURE: 88 MMHG | HEIGHT: 66 IN | BODY MASS INDEX: 30.86 KG/M2 | SYSTOLIC BLOOD PRESSURE: 138 MMHG | TEMPERATURE: 97 F

## 2025-04-11 DIAGNOSIS — R14.2 BURPING: ICD-10-CM

## 2025-04-11 DIAGNOSIS — R42 DIZZINESS AFTER EXTENSION OF NECK: Primary | ICD-10-CM

## 2025-04-11 DIAGNOSIS — R07.2 RETROSTERNAL CHEST PAIN: ICD-10-CM

## 2025-04-11 PROCEDURE — 1036F TOBACCO NON-USER: CPT | Performed by: FAMILY MEDICINE

## 2025-04-11 PROCEDURE — 93000 ELECTROCARDIOGRAM COMPLETE: CPT | Performed by: FAMILY MEDICINE

## 2025-04-11 PROCEDURE — 3075F SYST BP GE 130 - 139MM HG: CPT | Performed by: FAMILY MEDICINE

## 2025-04-11 PROCEDURE — 99214 OFFICE O/P EST MOD 30 MIN: CPT | Performed by: FAMILY MEDICINE

## 2025-04-11 PROCEDURE — 3079F DIAST BP 80-89 MM HG: CPT | Performed by: FAMILY MEDICINE

## 2025-04-11 PROCEDURE — 3008F BODY MASS INDEX DOCD: CPT | Performed by: FAMILY MEDICINE

## 2025-04-11 RX ORDER — MECLIZINE HCL 12.5 MG 12.5 MG/1
12.5 TABLET ORAL 3 TIMES DAILY PRN
Qty: 30 TABLET | Refills: 0 | Status: SHIPPED | OUTPATIENT
Start: 2025-04-11 | End: 2026-04-11

## 2025-04-11 RX ORDER — FAMOTIDINE 20 MG/1
20 TABLET, FILM COATED ORAL 2 TIMES DAILY
Qty: 30 TABLET | Refills: 0 | Status: SHIPPED | OUTPATIENT
Start: 2025-04-11 | End: 2025-10-08

## 2025-04-11 ASSESSMENT — PATIENT HEALTH QUESTIONNAIRE - PHQ9
SUM OF ALL RESPONSES TO PHQ9 QUESTIONS 1 AND 2: 0
1. LITTLE INTEREST OR PLEASURE IN DOING THINGS: NOT AT ALL
2. FEELING DOWN, DEPRESSED OR HOPELESS: NOT AT ALL

## 2025-04-11 NOTE — PROGRESS NOTES
"Subjective   Patient ID: Celia Gomez is a 64 y.o. female who presents for Dizziness (C/o dizziness that started at dentist last week, nothing yesterday and today. When happens right side of head is \"heavier\" and feels like being pulled to right. Also c/o chest pain last night that may have been gas. ).    HPI   Here today with complaint of dizziness.  Onset last week while laying reclined at dentist.  Room started spinning.  Lasted for approx 1 hour.  Assoc nausea.  Spontaneously subsided.  Similar episodes several times per day up until yesterday, typically associated with movement of the head.  No syncope.  During acute symptoms, often felt that she drifted to the right when walking  No numbness or tingling of the extremities.  No weakness.  Did not experience any episodes of dizziness yesterday, but yesterday night-onset of deep squeezing substernal cp associated with belching while laying in bed.  Earlier that night had had steak dinner with mashed potatoes.  Pain improved after belching. No associated diaphoresis, shortness of breath, nausea, palpitations.  At present, feels fine.  Review of Systems  Per HPI  Objective   /87 (BP Location: Left arm, Patient Position: Sitting)   Pulse 78   Temp 36.1 °C (97 °F)   Ht 1.676 m (5' 6\")   Wt 87.1 kg (192 lb)   LMP  (LMP Unknown)   BMI 30.99 kg/m²     Physical Exam  Vitals reviewed.   Constitutional:       General: She is not in acute distress.     Appearance: Normal appearance. She is obese. She is not ill-appearing, toxic-appearing or diaphoretic.   HENT:      Head: Normocephalic and atraumatic.      Right Ear: Tympanic membrane and ear canal normal.      Left Ear: Tympanic membrane and ear canal normal.      Nose: Nose normal. No congestion or rhinorrhea.      Mouth/Throat:      Mouth: Mucous membranes are moist.      Pharynx: Oropharynx is clear.   Eyes:      Extraocular Movements: Extraocular movements intact.      Conjunctiva/sclera: " Conjunctivae normal.      Pupils: Pupils are equal, round, and reactive to light.      Comments: No nystagmus   Neck:      Vascular: No carotid bruit.   Cardiovascular:      Rate and Rhythm: Normal rate and regular rhythm.      Pulses: Normal pulses.      Heart sounds: Normal heart sounds. No murmur heard.  Pulmonary:      Effort: Pulmonary effort is normal. No respiratory distress.      Breath sounds: Normal breath sounds.   Abdominal:      General: Abdomen is flat. Bowel sounds are normal. There is no distension.      Palpations: Abdomen is soft.      Tenderness: There is no abdominal tenderness. There is no right CVA tenderness or left CVA tenderness.   Musculoskeletal:         General: Normal range of motion.      Cervical back: Normal range of motion and neck supple.      Right lower leg: No edema.      Left lower leg: No edema.   Lymphadenopathy:      Cervical: No cervical adenopathy.   Skin:     General: Skin is warm and dry.   Neurological:      General: No focal deficit present.      Mental Status: She is alert and oriented to person, place, and time.      Cranial Nerves: No cranial nerve deficit.      Sensory: No sensory deficit.      Motor: No weakness.      Gait: Gait normal.      Comments: Mild symptoms provoked with modified Savi-Hallpike maneuver to the right, no symptoms maneuver to the left.   Psychiatric:         Mood and Affect: Mood normal.         Thought Content: Thought content normal.         Judgment: Judgment normal.     Reviewed most recent MRCP, previous EKG September 2024, x-ray of the cervical spine 2017    Assessment/Plan   Assessment & Plan  Dizziness after extension of neck  Symptoms currently are resolved.  Possible benign positional vertigo versus cervical radiculitis symptoms.  Rx provided for acute symptom relief for meclizine.  Caution of potential for sedation when taking.  Orders:    meclizine (Antivert) 12.5 mg tablet; Take 1 tablet (12.5 mg) by mouth 3 times a day as needed  for dizziness.    Retrosternal chest pain  Suspect GI etiology of retrosternal chest plain.  Short-term trial of famotidine.  Avoid rich, greasy foods.  Advised to follow-up with any recurrence of symptoms.  If any severe, persistent chest pain, seek emergency medical care.  Orders:    ECG 12 lead (Clinic Performed)    Burping    Orders:    famotidine (Pepcid) 20 mg tablet; Take 1 tablet (20 mg) by mouth 2 times a day.

## 2025-07-23 ENCOUNTER — OFFICE VISIT (OUTPATIENT)
Dept: PRIMARY CARE | Facility: CLINIC | Age: 65
End: 2025-07-23
Payer: COMMERCIAL

## 2025-07-23 VITALS
BODY MASS INDEX: 30.89 KG/M2 | HEIGHT: 66 IN | SYSTOLIC BLOOD PRESSURE: 126 MMHG | TEMPERATURE: 97 F | WEIGHT: 192.2 LBS | HEART RATE: 87 BPM | DIASTOLIC BLOOD PRESSURE: 85 MMHG

## 2025-07-23 DIAGNOSIS — R93.1 AGATSTON CAC SCORE, <100: ICD-10-CM

## 2025-07-23 DIAGNOSIS — M47.22 OSTEOARTHRITIS OF SPINE WITH RADICULOPATHY, CERVICAL REGION: ICD-10-CM

## 2025-07-23 DIAGNOSIS — R73.9 HYPERGLYCEMIA: ICD-10-CM

## 2025-07-23 DIAGNOSIS — R76.8 ANA POSITIVE: Primary | ICD-10-CM

## 2025-07-23 DIAGNOSIS — K21.9 GASTROESOPHAGEAL REFLUX DISEASE WITHOUT ESOPHAGITIS: ICD-10-CM

## 2025-07-23 DIAGNOSIS — Z00.00 WELL ADULT HEALTH CHECK: ICD-10-CM

## 2025-07-23 DIAGNOSIS — E78.5 DYSLIPIDEMIA: ICD-10-CM

## 2025-07-23 DIAGNOSIS — I72.8 ANEURYSM OF SPLENIC ARTERY: ICD-10-CM

## 2025-07-23 DIAGNOSIS — I10 PRIMARY HYPERTENSION: ICD-10-CM

## 2025-07-23 DIAGNOSIS — M79.7 FIBROMYALGIA: ICD-10-CM

## 2025-07-23 DIAGNOSIS — G47.33 OBSTRUCTIVE SLEEP APNEA SYNDROME: ICD-10-CM

## 2025-07-23 PROCEDURE — 99214 OFFICE O/P EST MOD 30 MIN: CPT | Performed by: INTERNAL MEDICINE

## 2025-07-23 PROCEDURE — 3074F SYST BP LT 130 MM HG: CPT | Performed by: INTERNAL MEDICINE

## 2025-07-23 PROCEDURE — 3079F DIAST BP 80-89 MM HG: CPT | Performed by: INTERNAL MEDICINE

## 2025-07-23 PROCEDURE — 3008F BODY MASS INDEX DOCD: CPT | Performed by: INTERNAL MEDICINE

## 2025-07-23 RX ORDER — PANTOPRAZOLE SODIUM 40 MG/1
40 TABLET, DELAYED RELEASE ORAL DAILY
Qty: 90 TABLET | Refills: 1 | Status: SHIPPED | OUTPATIENT
Start: 2025-07-23 | End: 2026-07-23

## 2025-07-23 NOTE — PROGRESS NOTES
Assessment/Plan   Problem List Items Addressed This Visit       Osteoarthritis of spine with radiculopathy, cervical region    Relevant Orders    Referral to Neurology    Dyslipidemia    Gastroesophageal reflux disease    Relevant Medications    pantoprazole (ProtoNix) 40 mg EC tablet    Fibromyalgia    Relevant Orders    Comprehensive Metabolic Panel    Hypertension    Relevant Orders    Comprehensive Metabolic Panel    Hyperglycemia    Relevant Orders    Hemoglobin A1C    Obstructive sleep apnea syndrome    XU positive - Primary    Relevant Orders    Referral to Neurology    Aneurysm of splenic artery    Agatston CAC score, <100     Other Visit Diagnoses         Well adult health check        Relevant Orders    Comprehensive Metabolic Panel        Advised to see neurologist discussed the plan for dizziness cervical radiculopathy and headache  May have to see ENT Doctor  Follow-up with rheumatologist  Continue to follow with gastroenterologist and vascular surgeon as advised  Last blood work has shown elevated blood sugar repeat labs as ordered  Follow-up with primary care physician of her choice 3-month or as needed    Subjective   Patient ID: Celia Gomez is a 64 y.o. female who presents for Follow-up (Blood work results follow up).    Surgical History[1]   Family History[2]   Social History     Socioeconomic History    Marital status:      Spouse name: Not on file    Number of children: Not on file    Years of education: Not on file    Highest education level: Not on file   Occupational History    Not on file   Tobacco Use    Smoking status: Former     Current packs/day: 0.00     Average packs/day: 0.3 packs/day for 35.0 years (8.8 ttl pk-yrs)     Types: Cigarettes     Start date: 1980     Quit date: 2015     Years since quitting: 10.5    Smokeless tobacco: Never   Vaping Use    Vaping status: Never Used   Substance and Sexual Activity    Alcohol use: Yes     Comment: occ wine    Drug use: Never     "Sexual activity: Not on file   Other Topics Concern    Not on file   Social History Narrative    Not on file     Social Drivers of Health     Financial Resource Strain: Not on file   Food Insecurity: No Food Insecurity (1/22/2024)    Hunger Vital Sign     Worried About Running Out of Food in the Last Year: Never true     Ran Out of Food in the Last Year: Never true   Transportation Needs: Not on file   Physical Activity: Not on file   Stress: Not on file   Social Connections: Not on file   Intimate Partner Violence: Not on file   Housing Stability: Not on file      Citrus and derivatives, Fruit flavor, and Mold   Current Rx[3]   Vitals:    07/23/25 1031   BP: 126/85   BP Location: Left arm   Patient Position: Sitting   Pulse: 87   Temp: 36.1 °C (97 °F)   Weight: 87.2 kg (192 lb 3.2 oz)   Height: 1.676 m (5' 6\")      Problem List Items Addressed This Visit       Osteoarthritis of spine with radiculopathy, cervical region    Relevant Orders    Referral to Neurology    Dyslipidemia    Gastroesophageal reflux disease    Relevant Medications    pantoprazole (ProtoNix) 40 mg EC tablet    Fibromyalgia    Relevant Orders    Comprehensive Metabolic Panel    Hypertension    Relevant Orders    Comprehensive Metabolic Panel    Hyperglycemia    Relevant Orders    Hemoglobin A1C    Obstructive sleep apnea syndrome    XU positive - Primary    Relevant Orders    Referral to Neurology    Aneurysm of splenic artery    Agatston CAC score, <100     Other Visit Diagnoses         Well adult health check        Relevant Orders    Comprehensive Metabolic Panel           Orders Placed This Encounter   Procedures    Hemoglobin A1C     Standing Status:   Future     Number of Occurrences:   1     Expected Date:   7/23/2025     Expiration Date:   7/23/2026     Release result to [employer]:   Immediate    Comprehensive Metabolic Panel     Standing Status:   Future     Number of Occurrences:   1     Expected Date:   7/23/2025     Expiration Date:   " "7/23/2026     Release result to [employer]:   Immediate    Referral to Neurology     Standing Status:   Future     Expected Date:   7/23/2025     Expiration Date:   7/23/2026     Referral Priority:   Routine     Referral Type:   Consultation     Referral Reason:   Specialty Services Required     Requested Specialty:   Neurology     Number of Visits Requested:   1        HPI  This is a 64-year-old female came for periodic review  Wanted to discuss the results of the lab test  She continues to have cervical radiculopathic symptoms but also had headache on a daily basis and dizziness intermittently  She has history of pancreatic cyst and splenic artery aneurysm and has followed with vascular surgeon and gastroenterologist  She is stopped Protonix and she started having GERD symptoms and wanted refill on Protonix  She has fibromyalgia and has plan to follow-up with rheumatologist she has seen  Feels that her fibromyalgia is acting up  ROS  Above  Past medical history reviewed  Social and family history reviewed  Allergies and medications reviewed  Recent labs reviewed  Vital signs reviewed    PHYSICAL EXAM  Respiratory chest abdominal neurological examination normal      No results found for: \"PR1\", \"BMPR1A\", \"CMPLAS\", \"[id number]\", \"KPSAT\"   Lab Results   Component Value Date    CHOL 148 04/04/2025    LDLCALC 77 04/04/2025    CHHDL 2.7 04/04/2025     Lab Results   Component Value Date    TSH 1.87 04/04/2025    HGBA1C 5.3 05/18/2020              === 05/04/23 ===    CT ABDOMEN PELVIS ANGIOGRAM W AND/OR WO IV CONTRAST    - Impression -  1. Similar appearance and size of peripherally calcified splenic  aneurysms as described above.  2. No other vascular abnormality visualized. No evidence of aortic  aneurysm or dissection.  3. Hypoattenuating lesion at the head of the pancreas, without  apparent communication with the main pancreatic duct. Recommend MRCP  for further evaluation.  4. Additional findings as above.    I personally " reviewed the images/study and I agree with the findings  as stated. This study was interpreted at [facility], [place], [place].    Results for orders placed in visit on 11/23/22    ECHOCARDIOGRAM    Narrative  Ordered by an unspecified provider.                          [1]   Past Surgical History:  Procedure Laterality Date    CT ABDOMEN PELVIS ANGIOGRAM W AND/OR WO IV CONTRAST  05/04/2023    CT ABDOMEN PELVIS ANGIOGRAM W AND/OR WO IV CONTRAST 5/4/2023 [facility]    INCISIONAL BREAST BIOPSY  10/10/2017    Incisional Breast Biopsy    OTHER SURGICAL HISTORY  12/17/2012    Electrocardiogram    OTHER SURGICAL HISTORY  10/26/2022    Endoscopy    OTHER SURGICAL HISTORY  10/26/2022    Colonoscopy    OTHER SURGICAL HISTORY  05/23/2022    Wrist surgery    OTHER SURGICAL HISTORY  10/10/2017    Breast Surgery Puncture Aspiration Of Cyst   [2]   Family History  Problem Relation Name Age of Onset    Breast cancer Maternal Grandmother  60   [3]   Current Outpatient Medications   Medication Sig Dispense Refill    ascorbic acid (VITAMIN C ORAL) Take by mouth.      cholecalciferol (Vitamin D-3) 25 MCG (1000 UT) tablet Take 1 tablet (1,000 Units) by mouth once daily.      cyanocobalamin (Vitamin B-12) 500 mcg tablet Take 1 tablet (500 mcg) by mouth once daily.      DULoxetine (Cymbalta) 60 mg DR capsule Take 1 capsule (60 mg) by mouth once daily. 90 capsule 1    hydroCHLOROthiazide (HYDRODiuril) 25 mg tablet Take 1 tablet (25 mg) by mouth once daily. 90 tablet 1    ibuprofen 200 mg tablet Take by mouth.      magnesium oxide 400 mg magnesium capsule Take 1 capsule (400 mg) by mouth once daily.      meclizine (Antivert) 12.5 mg tablet Take 1 tablet (12.5 mg) by mouth 3 times a day as needed for dizziness. 30 tablet 0    multivit-min/ferrous fumarate (MULTI VITAMIN ORAL) Take 1 tablet by mouth once daily.      rosuvastatin (Crestor) 5 mg tablet Take 1 tablet (5 mg) by mouth once daily. 90 tablet 1     TURMERIC ORAL Take 1 tablet by mouth once daily.      vitamin E 90 mg (200 unit) capsule Take 1 capsule (200 Units) by mouth once daily.      pantoprazole (ProtoNix) 40 mg EC tablet Take 1 tablet (40 mg) by mouth once daily. Do not crush, chew, or split. 90 tablet 1     No current facility-administered medications for this visit.

## 2025-07-29 ENCOUNTER — PATIENT MESSAGE (OUTPATIENT)
Facility: CLINIC | Age: 65
End: 2025-07-29
Payer: COMMERCIAL

## 2025-07-30 ENCOUNTER — APPOINTMENT (OUTPATIENT)
Facility: CLINIC | Age: 65
End: 2025-07-30
Payer: COMMERCIAL

## 2025-07-30 ENCOUNTER — OFFICE VISIT (OUTPATIENT)
Dept: PRIMARY CARE | Facility: CLINIC | Age: 65
End: 2025-07-30
Payer: COMMERCIAL

## 2025-07-30 ENCOUNTER — ANCILLARY PROCEDURE (OUTPATIENT)
Dept: CARDIOLOGY | Facility: CLINIC | Age: 65
End: 2025-07-30
Payer: COMMERCIAL

## 2025-07-30 ENCOUNTER — HOSPITAL ENCOUNTER (OUTPATIENT)
Dept: RADIOLOGY | Facility: CLINIC | Age: 65
Discharge: HOME | End: 2025-07-30
Payer: COMMERCIAL

## 2025-07-30 ENCOUNTER — TELEPHONE (OUTPATIENT)
Dept: PRIMARY CARE | Facility: CLINIC | Age: 65
End: 2025-07-30

## 2025-07-30 VITALS
WEIGHT: 191 LBS | HEIGHT: 66 IN | HEART RATE: 79 BPM | BODY MASS INDEX: 30.7 KG/M2 | SYSTOLIC BLOOD PRESSURE: 112 MMHG | DIASTOLIC BLOOD PRESSURE: 80 MMHG

## 2025-07-30 VITALS
HEART RATE: 61 BPM | WEIGHT: 190.7 LBS | TEMPERATURE: 97.5 F | DIASTOLIC BLOOD PRESSURE: 68 MMHG | HEIGHT: 66 IN | BODY MASS INDEX: 30.65 KG/M2 | SYSTOLIC BLOOD PRESSURE: 114 MMHG

## 2025-07-30 DIAGNOSIS — M79.641 PAIN IN BOTH HANDS: ICD-10-CM

## 2025-07-30 DIAGNOSIS — R76.8 POSITIVE ANA (ANTINUCLEAR ANTIBODY): ICD-10-CM

## 2025-07-30 DIAGNOSIS — M79.7 FIBROMYALGIA: Primary | ICD-10-CM

## 2025-07-30 DIAGNOSIS — M79.7 FIBROMYALGIA: ICD-10-CM

## 2025-07-30 DIAGNOSIS — R00.2 PALPITATIONS: ICD-10-CM

## 2025-07-30 DIAGNOSIS — M79.642 PAIN IN BOTH HANDS: ICD-10-CM

## 2025-07-30 DIAGNOSIS — E55.9 VITAMIN D DEFICIENCY: ICD-10-CM

## 2025-07-30 DIAGNOSIS — R00.2 PALPITATIONS: Primary | ICD-10-CM

## 2025-07-30 LAB — BODY SURFACE AREA: 2.01 M2

## 2025-07-30 PROCEDURE — 3074F SYST BP LT 130 MM HG: CPT | Performed by: FAMILY MEDICINE

## 2025-07-30 PROCEDURE — 73100 X-RAY EXAM OF WRIST: CPT | Mod: 50

## 2025-07-30 PROCEDURE — 3078F DIAST BP <80 MM HG: CPT | Performed by: STUDENT IN AN ORGANIZED HEALTH CARE EDUCATION/TRAINING PROGRAM

## 2025-07-30 PROCEDURE — 3008F BODY MASS INDEX DOCD: CPT | Performed by: STUDENT IN AN ORGANIZED HEALTH CARE EDUCATION/TRAINING PROGRAM

## 2025-07-30 PROCEDURE — 73120 X-RAY EXAM OF HAND: CPT | Mod: 50

## 2025-07-30 PROCEDURE — 3074F SYST BP LT 130 MM HG: CPT | Performed by: STUDENT IN AN ORGANIZED HEALTH CARE EDUCATION/TRAINING PROGRAM

## 2025-07-30 PROCEDURE — 73120 X-RAY EXAM OF HAND: CPT | Mod: BILATERAL PROCEDURE | Performed by: RADIOLOGY

## 2025-07-30 PROCEDURE — 99214 OFFICE O/P EST MOD 30 MIN: CPT | Performed by: STUDENT IN AN ORGANIZED HEALTH CARE EDUCATION/TRAINING PROGRAM

## 2025-07-30 PROCEDURE — 3079F DIAST BP 80-89 MM HG: CPT | Performed by: FAMILY MEDICINE

## 2025-07-30 PROCEDURE — 99213 OFFICE O/P EST LOW 20 MIN: CPT | Performed by: FAMILY MEDICINE

## 2025-07-30 PROCEDURE — 3008F BODY MASS INDEX DOCD: CPT | Performed by: FAMILY MEDICINE

## 2025-07-30 PROCEDURE — 73100 X-RAY EXAM OF WRIST: CPT | Mod: BILATERAL PROCEDURE | Performed by: RADIOLOGY

## 2025-07-30 RX ORDER — GABAPENTIN 100 MG/1
100 CAPSULE ORAL 3 TIMES DAILY
Qty: 90 CAPSULE | Refills: 2 | Status: SHIPPED | OUTPATIENT
Start: 2025-07-30

## 2025-07-30 RX ORDER — CYCLOBENZAPRINE HCL 5 MG
5 TABLET ORAL 3 TIMES DAILY PRN
Qty: 30 TABLET | Refills: 0 | Status: SHIPPED | OUTPATIENT
Start: 2025-07-30 | End: 2025-08-09

## 2025-07-30 ASSESSMENT — ENCOUNTER SYMPTOMS
ACTIVITY CHANGE: 0
FATIGUE: 0
HEADACHES: 0
DIZZINESS: 0
SHORTNESS OF BREATH: 0
FEVER: 0
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
## 2025-07-30 NOTE — TELEPHONE ENCOUNTER
Pt aware you advised her to go to ER but has already set up an appt wit  for today to discuss heart palpitations.

## 2025-07-30 NOTE — PROGRESS NOTES
"Subjective   Patient ID: Celia Gomez is a 64 y.o. female who presents for Sick Visit (Heart palpations while walking her dog).    Palpitations   - reports she has been having intermittent palpitations for the last two weeks   - has had similar symptoms in the past, but generally they were self limited and resolved after a few days   - now she is having more significant and severe symptoms   - episode last night where she was walking the dog when she felt palpitations   - she got very dizzy and lightheaded at the time   - she did not pass out, but felt like her vision was closing   - after a few seconds the symptoms resolved   - has been continuing to have intermittent palpitations since the major episode last night   - has not had any extended episodes of symptoms   - denies any dyspnea, chest tightness or chest heaviness   - does not wear a smart device regularly   - feels more like her heart is beating faster rather then more irregular          Review of Systems   Constitutional:  Negative for activity change, fatigue and fever.   Respiratory:  Negative for shortness of breath.    Cardiovascular:  Negative for chest pain.   Neurological:  Negative for dizziness and headaches.       Objective   /80   Pulse 79   Ht 1.676 m (5' 6\")   Wt 86.6 kg (191 lb)   LMP  (LMP Unknown)   BMI 30.83 kg/m²     Physical Exam  Constitutional:       Appearance: Normal appearance.     Cardiovascular:      Rate and Rhythm: Normal rate and regular rhythm.   Pulmonary:      Effort: Pulmonary effort is normal.      Breath sounds: Normal breath sounds.     Neurological:      Mental Status: She is alert.     Psychiatric:         Mood and Affect: Mood normal.         Behavior: Behavior normal.         Assessment/Plan   Problem List Items Addressed This Visit    None         " pending results     Relevant Orders    Tsh With Reflex To Free T4 If Abnormal    Holter or Event Cardiac Monitor (Completed)    Magnesium

## 2025-07-31 LAB
25(OH)D3+25(OH)D2 SERPL-MCNC: 46 NG/ML (ref 30–100)
ALBUMIN SERPL-MCNC: 4.6 G/DL (ref 3.6–5.1)
ALBUMIN SERPL-MCNC: 4.8 G/DL (ref 3.6–5.1)
ALP SERPL-CCNC: 60 U/L (ref 37–153)
ALP SERPL-CCNC: 61 U/L (ref 37–153)
ALT SERPL-CCNC: 20 U/L (ref 6–29)
ALT SERPL-CCNC: 21 U/L (ref 6–29)
ANION GAP SERPL CALCULATED.4IONS-SCNC: 10 MMOL/L (CALC) (ref 7–17)
ANION GAP SERPL CALCULATED.4IONS-SCNC: 10 MMOL/L (CALC) (ref 7–17)
AST SERPL-CCNC: 20 U/L (ref 10–35)
AST SERPL-CCNC: 22 U/L (ref 10–35)
BASOPHILS # BLD AUTO: 48 CELLS/UL (ref 0–200)
BASOPHILS NFR BLD AUTO: 0.8 %
BILIRUB SERPL-MCNC: 0.5 MG/DL (ref 0.2–1.2)
BILIRUB SERPL-MCNC: 0.5 MG/DL (ref 0.2–1.2)
BUN SERPL-MCNC: 14 MG/DL (ref 7–25)
BUN SERPL-MCNC: 14 MG/DL (ref 7–25)
CALCIUM SERPL-MCNC: 9.3 MG/DL (ref 8.6–10.4)
CALCIUM SERPL-MCNC: 9.4 MG/DL (ref 8.6–10.4)
CCP IGG SERPL-ACNC: <16 UNITS
CHLORIDE SERPL-SCNC: 102 MMOL/L (ref 98–110)
CHLORIDE SERPL-SCNC: 104 MMOL/L (ref 98–110)
CO2 SERPL-SCNC: 26 MMOL/L (ref 20–32)
CO2 SERPL-SCNC: 27 MMOL/L (ref 20–32)
CREAT SERPL-MCNC: 0.76 MG/DL (ref 0.5–1.05)
CREAT SERPL-MCNC: 0.79 MG/DL (ref 0.5–1.05)
CRP SERPL-MCNC: <3 MG/L
EGFRCR SERPLBLD CKD-EPI 2021: 84 ML/MIN/1.73M2
EGFRCR SERPLBLD CKD-EPI 2021: 88 ML/MIN/1.73M2
EOSINOPHIL # BLD AUTO: 138 CELLS/UL (ref 15–500)
EOSINOPHIL NFR BLD AUTO: 2.3 %
ERYTHROCYTE [DISTWIDTH] IN BLOOD BY AUTOMATED COUNT: 14.8 % (ref 11–15)
ERYTHROCYTE [SEDIMENTATION RATE] IN BLOOD BY WESTERGREN METHOD: 6 MM/H
EST. AVERAGE GLUCOSE BLD GHB EST-MCNC: 123 MG/DL
EST. AVERAGE GLUCOSE BLD GHB EST-SCNC: 6.8 MMOL/L
GLUCOSE SERPL-MCNC: 108 MG/DL (ref 65–99)
GLUCOSE SERPL-MCNC: 109 MG/DL (ref 65–99)
HBA1C MFR BLD: 5.9 %
HCT VFR BLD AUTO: 42.3 % (ref 35–45)
HGB BLD-MCNC: 13.2 G/DL (ref 11.7–15.5)
LYMPHOCYTES # BLD AUTO: 2244 CELLS/UL (ref 850–3900)
LYMPHOCYTES NFR BLD AUTO: 37.4 %
MCH RBC QN AUTO: 27.7 PG (ref 27–33)
MCHC RBC AUTO-ENTMCNC: 31.2 G/DL (ref 32–36)
MCV RBC AUTO: 88.7 FL (ref 80–100)
MONOCYTES # BLD AUTO: 330 CELLS/UL (ref 200–950)
MONOCYTES NFR BLD AUTO: 5.5 %
NEUTROPHILS # BLD AUTO: 3240 CELLS/UL (ref 1500–7800)
NEUTROPHILS NFR BLD AUTO: 54 %
PLATELET # BLD AUTO: 342 THOUSAND/UL (ref 140–400)
PMV BLD REES-ECKER: 9.1 FL (ref 7.5–12.5)
POTASSIUM SERPL-SCNC: 4.1 MMOL/L (ref 3.5–5.3)
POTASSIUM SERPL-SCNC: 4.2 MMOL/L (ref 3.5–5.3)
PROT SERPL-MCNC: 7.3 G/DL (ref 6.1–8.1)
PROT SERPL-MCNC: 7.4 G/DL (ref 6.1–8.1)
RBC # BLD AUTO: 4.77 MILLION/UL (ref 3.8–5.1)
RHEUMATOID FACT SERPL-ACNC: <10 IU/ML
SODIUM SERPL-SCNC: 139 MMOL/L (ref 135–146)
SODIUM SERPL-SCNC: 140 MMOL/L (ref 135–146)
WBC # BLD AUTO: 6 THOUSAND/UL (ref 3.8–10.8)

## 2025-08-05 ENCOUNTER — RESULTS FOLLOW-UP (OUTPATIENT)
Dept: PRIMARY CARE | Facility: CLINIC | Age: 65
End: 2025-08-05

## 2025-08-05 LAB — BODY SURFACE AREA: 2.01 M2

## 2025-08-05 PROCEDURE — 93227 XTRNL ECG REC<48 HR R&I: CPT | Performed by: INTERNAL MEDICINE

## 2025-08-06 NOTE — TELEPHONE ENCOUNTER
----- Message from Carrie Torres sent at 8/5/2025 10:23 AM EDT -----  Acceptable  ----- Message -----  From: [facility] Results In  Sent: 7/31/2025   6:42 AM EDT  To: Carrie Torres MD

## 2025-08-12 LAB
MAGNESIUM SERPL-MCNC: 2.2 MG/DL (ref 1.5–2.5)
TSH SERPL-ACNC: 2.04 MIU/L (ref 0.4–4.5)

## 2025-08-14 ENCOUNTER — APPOINTMENT (OUTPATIENT)
Facility: CLINIC | Age: 65
End: 2025-08-14
Payer: COMMERCIAL

## 2025-08-16 ENCOUNTER — HOSPITAL ENCOUNTER (OUTPATIENT)
Dept: RADIOLOGY | Facility: HOSPITAL | Age: 65
Discharge: HOME | End: 2025-08-16
Payer: COMMERCIAL

## 2025-08-16 DIAGNOSIS — K86.2 PANCREATIC CYST (HHS-HCC): ICD-10-CM

## 2025-08-16 PROCEDURE — A9575 INJ GADOTERATE MEGLUMI 0.1ML: HCPCS | Performed by: INTERNAL MEDICINE

## 2025-08-16 PROCEDURE — 74183 MRI ABD W/O CNTR FLWD CNTR: CPT

## 2025-08-16 PROCEDURE — 74183 MRI ABD W/O CNTR FLWD CNTR: CPT | Performed by: STUDENT IN AN ORGANIZED HEALTH CARE EDUCATION/TRAINING PROGRAM

## 2025-08-16 PROCEDURE — 76376 3D RENDER W/INTRP POSTPROCES: CPT | Performed by: STUDENT IN AN ORGANIZED HEALTH CARE EDUCATION/TRAINING PROGRAM

## 2025-08-16 PROCEDURE — 2550000001 HC RX 255 CONTRASTS: Performed by: INTERNAL MEDICINE

## 2025-08-16 RX ORDER — GADOTERATE MEGLUMINE 376.9 MG/ML
17 INJECTION INTRAVENOUS
Status: COMPLETED | OUTPATIENT
Start: 2025-08-16 | End: 2025-08-16

## 2025-08-16 RX ADMIN — GADOTERATE MEGLUMINE 17 ML: 376.9 INJECTION INTRAVENOUS at 11:07

## 2025-08-18 ENCOUNTER — APPOINTMENT (OUTPATIENT)
Dept: PRIMARY CARE | Facility: CLINIC | Age: 65
End: 2025-08-18
Payer: COMMERCIAL

## 2025-08-18 VITALS
WEIGHT: 191 LBS | HEART RATE: 84 BPM | SYSTOLIC BLOOD PRESSURE: 120 MMHG | DIASTOLIC BLOOD PRESSURE: 82 MMHG | HEIGHT: 66 IN | BODY MASS INDEX: 30.7 KG/M2

## 2025-08-18 DIAGNOSIS — I49.1 PREMATURE ATRIAL COMPLEXES: Primary | ICD-10-CM

## 2025-08-18 PROCEDURE — 3074F SYST BP LT 130 MM HG: CPT | Performed by: FAMILY MEDICINE

## 2025-08-18 PROCEDURE — 99213 OFFICE O/P EST LOW 20 MIN: CPT | Performed by: FAMILY MEDICINE

## 2025-08-18 PROCEDURE — 3008F BODY MASS INDEX DOCD: CPT | Performed by: FAMILY MEDICINE

## 2025-08-18 PROCEDURE — 3079F DIAST BP 80-89 MM HG: CPT | Performed by: FAMILY MEDICINE

## 2025-08-18 ASSESSMENT — ENCOUNTER SYMPTOMS
FEVER: 0
DIZZINESS: 0
ACTIVITY CHANGE: 0
HEADACHES: 0
FATIGUE: 0
SHORTNESS OF BREATH: 0

## 2025-08-19 ENCOUNTER — TELEPHONE (OUTPATIENT)
Dept: GASTROENTEROLOGY | Facility: CLINIC | Age: 65
End: 2025-08-19
Payer: COMMERCIAL

## 2025-08-21 DIAGNOSIS — K86.2 PANCREATIC CYST (HHS-HCC): ICD-10-CM

## 2025-08-21 DIAGNOSIS — K76.89 LIVER CYST: ICD-10-CM

## 2025-08-25 ENCOUNTER — APPOINTMENT (OUTPATIENT)
Facility: CLINIC | Age: 65
End: 2025-08-25
Payer: COMMERCIAL

## 2025-08-25 VITALS
HEIGHT: 66 IN | WEIGHT: 193.4 LBS | DIASTOLIC BLOOD PRESSURE: 80 MMHG | SYSTOLIC BLOOD PRESSURE: 134 MMHG | BODY MASS INDEX: 31.08 KG/M2

## 2025-08-25 DIAGNOSIS — N95.1 MENOPAUSE SYNDROME: Primary | ICD-10-CM

## 2025-08-25 DIAGNOSIS — Z01.419 WELL WOMAN EXAM WITH ROUTINE GYNECOLOGICAL EXAM: ICD-10-CM

## 2025-08-25 DIAGNOSIS — Z12.31 ENCOUNTER FOR SCREENING MAMMOGRAM FOR MALIGNANT NEOPLASM OF BREAST: ICD-10-CM

## 2025-08-25 PROCEDURE — 3008F BODY MASS INDEX DOCD: CPT | Performed by: OBSTETRICS & GYNECOLOGY

## 2025-08-25 PROCEDURE — 3075F SYST BP GE 130 - 139MM HG: CPT | Performed by: OBSTETRICS & GYNECOLOGY

## 2025-08-25 PROCEDURE — 3079F DIAST BP 80-89 MM HG: CPT | Performed by: OBSTETRICS & GYNECOLOGY

## 2025-08-25 PROCEDURE — 99396 PREV VISIT EST AGE 40-64: CPT | Performed by: OBSTETRICS & GYNECOLOGY

## 2025-08-25 RX ORDER — ALBUTEROL SULFATE 90 UG/1
INHALANT RESPIRATORY (INHALATION)
COMMUNITY
Start: 2025-08-20

## 2025-09-05 ENCOUNTER — APPOINTMENT (OUTPATIENT)
Dept: GASTROENTEROLOGY | Facility: CLINIC | Age: 65
End: 2025-09-05
Payer: COMMERCIAL

## 2025-09-11 ENCOUNTER — APPOINTMENT (OUTPATIENT)
Dept: PRIMARY CARE | Facility: CLINIC | Age: 65
End: 2025-09-11
Payer: COMMERCIAL

## 2025-09-15 ENCOUNTER — APPOINTMENT (OUTPATIENT)
Facility: CLINIC | Age: 65
End: 2025-09-15
Payer: COMMERCIAL

## 2026-02-02 ENCOUNTER — APPOINTMENT (OUTPATIENT)
Dept: NEUROLOGY | Facility: CLINIC | Age: 66
End: 2026-02-02
Payer: COMMERCIAL